# Patient Record
Sex: FEMALE | Race: WHITE | Employment: FULL TIME | URBAN - METROPOLITAN AREA
[De-identification: names, ages, dates, MRNs, and addresses within clinical notes are randomized per-mention and may not be internally consistent; named-entity substitution may affect disease eponyms.]

---

## 2017-10-19 ENCOUNTER — TELEPHONE (OUTPATIENT)
Dept: FAMILY MEDICINE CLINIC | Facility: CLINIC | Age: 32
End: 2017-10-19

## 2017-11-28 ENCOUNTER — OFFICE VISIT (OUTPATIENT)
Dept: FAMILY MEDICINE CLINIC | Facility: CLINIC | Age: 32
End: 2017-11-28

## 2017-11-28 VITALS
TEMPERATURE: 98 F | HEIGHT: 68 IN | BODY MASS INDEX: 31.52 KG/M2 | HEART RATE: 72 BPM | DIASTOLIC BLOOD PRESSURE: 72 MMHG | WEIGHT: 208 LBS | SYSTOLIC BLOOD PRESSURE: 102 MMHG

## 2017-11-28 DIAGNOSIS — Z00.00 LABORATORY EXAM ORDERED AS PART OF ROUTINE GENERAL MEDICAL EXAMINATION: ICD-10-CM

## 2017-11-28 DIAGNOSIS — Z00.00 ROUTINE GENERAL MEDICAL EXAMINATION AT A HEALTH CARE FACILITY: Primary | ICD-10-CM

## 2017-11-28 PROCEDURE — 99395 PREV VISIT EST AGE 18-39: CPT | Performed by: FAMILY MEDICINE

## 2017-11-28 NOTE — PROGRESS NOTES
HPI:   Erin Sánchez is a 34year old female who presents for a complete physical exam.  Last pap:   With GYN 2015; ; scheduled with GYN in 2018  Last mammogram:  n/a  Menses:  monthly  Contraception:  no  Previous colonoscopy:  no  Family hx of roman Children: 1 son.       REVIEW OF SYSTEMS:   GENERAL: feels well otherwise  SKIN: denies any unusual skin lesions  EYES:no vision problems  LUNGS: denies shortness of breath  CARDIOVASCULAR: denies chest pain  GI: denies abdominal pain,  No constipation or d

## 2017-12-18 ENCOUNTER — LABORATORY ENCOUNTER (OUTPATIENT)
Dept: LAB | Age: 32
End: 2017-12-18
Attending: FAMILY MEDICINE
Payer: COMMERCIAL

## 2017-12-18 DIAGNOSIS — Z00.00 LABORATORY EXAM ORDERED AS PART OF ROUTINE GENERAL MEDICAL EXAMINATION: ICD-10-CM

## 2017-12-18 PROCEDURE — 80053 COMPREHEN METABOLIC PANEL: CPT

## 2017-12-18 PROCEDURE — 80061 LIPID PANEL: CPT

## 2017-12-18 PROCEDURE — 85025 COMPLETE CBC W/AUTO DIFF WBC: CPT

## 2017-12-18 PROCEDURE — 84443 ASSAY THYROID STIM HORMONE: CPT

## 2017-12-18 PROCEDURE — 36415 COLL VENOUS BLD VENIPUNCTURE: CPT

## 2018-09-10 PROBLEM — O09.529 ADVANCED MATERNAL AGE IN MULTIGRAVIDA (HCC): Status: ACTIVE | Noted: 2018-09-10

## 2018-09-10 PROBLEM — O09.529 ADVANCED MATERNAL AGE IN MULTIGRAVIDA: Status: ACTIVE | Noted: 2018-09-10

## 2018-09-10 PROCEDURE — 87086 URINE CULTURE/COLONY COUNT: CPT | Performed by: OBSTETRICS & GYNECOLOGY

## 2018-09-10 PROCEDURE — 88175 CYTOPATH C/V AUTO FLUID REDO: CPT | Performed by: OBSTETRICS & GYNECOLOGY

## 2018-09-10 PROCEDURE — 87624 HPV HI-RISK TYP POOLED RSLT: CPT | Performed by: OBSTETRICS & GYNECOLOGY

## 2018-10-01 PROCEDURE — 86762 RUBELLA ANTIBODY: CPT | Performed by: OBSTETRICS & GYNECOLOGY

## 2018-10-01 PROCEDURE — 86901 BLOOD TYPING SEROLOGIC RH(D): CPT | Performed by: OBSTETRICS & GYNECOLOGY

## 2018-10-01 PROCEDURE — 86900 BLOOD TYPING SEROLOGIC ABO: CPT | Performed by: OBSTETRICS & GYNECOLOGY

## 2018-10-01 PROCEDURE — 87389 HIV-1 AG W/HIV-1&-2 AB AG IA: CPT | Performed by: OBSTETRICS & GYNECOLOGY

## 2018-10-01 PROCEDURE — 86850 RBC ANTIBODY SCREEN: CPT | Performed by: OBSTETRICS & GYNECOLOGY

## 2018-10-01 PROCEDURE — 86780 TREPONEMA PALLIDUM: CPT | Performed by: OBSTETRICS & GYNECOLOGY

## 2018-11-21 PROCEDURE — 82105 ALPHA-FETOPROTEIN SERUM: CPT | Performed by: OBSTETRICS & GYNECOLOGY

## 2018-11-21 PROCEDURE — 36415 COLL VENOUS BLD VENIPUNCTURE: CPT | Performed by: OBSTETRICS & GYNECOLOGY

## 2019-04-18 ENCOUNTER — TELEPHONE (OUTPATIENT)
Dept: OBGYN UNIT | Facility: HOSPITAL | Age: 34
End: 2019-04-18

## 2019-04-22 ENCOUNTER — APPOINTMENT (OUTPATIENT)
Dept: OBGYN CLINIC | Facility: HOSPITAL | Age: 34
End: 2019-04-22
Payer: COMMERCIAL

## 2019-04-22 ENCOUNTER — TELEPHONE (OUTPATIENT)
Dept: OBGYN UNIT | Facility: HOSPITAL | Age: 34
End: 2019-04-22

## 2019-04-22 ENCOUNTER — HOSPITAL ENCOUNTER (INPATIENT)
Facility: HOSPITAL | Age: 34
LOS: 3 days | Discharge: HOME | End: 2019-04-25
Attending: OBSTETRICS & GYNECOLOGY | Admitting: OBSTETRICS & GYNECOLOGY
Payer: COMMERCIAL

## 2019-04-22 PROBLEM — Z34.90 PREGNANCY: Status: ACTIVE | Noted: 2019-04-22

## 2019-04-22 PROBLEM — Z34.90 PREGNANCY (HCC): Status: ACTIVE | Noted: 2019-04-22

## 2019-04-22 PROCEDURE — 86900 BLOOD TYPING SEROLOGIC ABO: CPT | Performed by: OBSTETRICS & GYNECOLOGY

## 2019-04-22 PROCEDURE — 85027 COMPLETE CBC AUTOMATED: CPT | Performed by: OBSTETRICS & GYNECOLOGY

## 2019-04-22 PROCEDURE — 86780 TREPONEMA PALLIDUM: CPT | Performed by: OBSTETRICS & GYNECOLOGY

## 2019-04-22 PROCEDURE — 86901 BLOOD TYPING SEROLOGIC RH(D): CPT | Performed by: OBSTETRICS & GYNECOLOGY

## 2019-04-22 PROCEDURE — 86850 RBC ANTIBODY SCREEN: CPT | Performed by: OBSTETRICS & GYNECOLOGY

## 2019-04-22 PROCEDURE — 86701 HIV-1ANTIBODY: CPT | Performed by: OBSTETRICS & GYNECOLOGY

## 2019-04-22 PROCEDURE — 3E0P7VZ INTRODUCTION OF HORMONE INTO FEMALE REPRODUCTIVE, VIA NATURAL OR ARTIFICIAL OPENING: ICD-10-PCS | Performed by: OBSTETRICS & GYNECOLOGY

## 2019-04-22 RX ORDER — TERBUTALINE SULFATE 1 MG/ML
0.25 INJECTION, SOLUTION SUBCUTANEOUS AS NEEDED
Status: DISCONTINUED | OUTPATIENT
Start: 2019-04-22 | End: 2019-04-23 | Stop reason: HOSPADM

## 2019-04-22 RX ORDER — IBUPROFEN 600 MG/1
600 TABLET ORAL ONCE AS NEEDED
Status: DISCONTINUED | OUTPATIENT
Start: 2019-04-22 | End: 2019-04-23 | Stop reason: HOSPADM

## 2019-04-22 RX ORDER — SODIUM CHLORIDE, SODIUM LACTATE, POTASSIUM CHLORIDE, CALCIUM CHLORIDE 600; 310; 30; 20 MG/100ML; MG/100ML; MG/100ML; MG/100ML
INJECTION, SOLUTION INTRAVENOUS CONTINUOUS
Status: DISCONTINUED | OUTPATIENT
Start: 2019-04-22 | End: 2019-04-23 | Stop reason: HOSPADM

## 2019-04-22 RX ORDER — TRISODIUM CITRATE DIHYDRATE AND CITRIC ACID MONOHYDRATE 500; 334 MG/5ML; MG/5ML
30 SOLUTION ORAL AS NEEDED
Status: DISCONTINUED | OUTPATIENT
Start: 2019-04-22 | End: 2019-04-23 | Stop reason: HOSPADM

## 2019-04-22 RX ORDER — DEXTROSE, SODIUM CHLORIDE, SODIUM LACTATE, POTASSIUM CHLORIDE, AND CALCIUM CHLORIDE 5; .6; .31; .03; .02 G/100ML; G/100ML; G/100ML; G/100ML; G/100ML
INJECTION, SOLUTION INTRAVENOUS AS NEEDED
Status: DISCONTINUED | OUTPATIENT
Start: 2019-04-22 | End: 2019-04-23 | Stop reason: HOSPADM

## 2019-04-22 RX ORDER — ZOLPIDEM TARTRATE 5 MG/1
5 TABLET ORAL NIGHTLY PRN
Status: DISCONTINUED | OUTPATIENT
Start: 2019-04-22 | End: 2019-04-23 | Stop reason: HOSPADM

## 2019-04-23 PROCEDURE — 0KQM0ZZ REPAIR PERINEUM MUSCLE, OPEN APPROACH: ICD-10-PCS | Performed by: OBSTETRICS & GYNECOLOGY

## 2019-04-23 PROCEDURE — 3E033VJ INTRODUCTION OF OTHER HORMONE INTO PERIPHERAL VEIN, PERCUTANEOUS APPROACH: ICD-10-PCS | Performed by: OBSTETRICS & GYNECOLOGY

## 2019-04-23 RX ORDER — IBUPROFEN 600 MG/1
600 TABLET ORAL EVERY 6 HOURS
Status: DISCONTINUED | OUTPATIENT
Start: 2019-04-24 | End: 2019-04-25

## 2019-04-23 RX ORDER — BISACODYL 10 MG
10 SUPPOSITORY, RECTAL RECTAL ONCE AS NEEDED
Status: DISCONTINUED | OUTPATIENT
Start: 2019-04-23 | End: 2019-04-25

## 2019-04-23 RX ORDER — ZOLPIDEM TARTRATE 5 MG/1
5 TABLET ORAL NIGHTLY PRN
Status: DISCONTINUED | OUTPATIENT
Start: 2019-04-23 | End: 2019-04-25

## 2019-04-23 RX ORDER — NALBUPHINE HCL 10 MG/ML
2.5 AMPUL (ML) INJECTION
Status: DISCONTINUED | OUTPATIENT
Start: 2019-04-23 | End: 2019-04-23

## 2019-04-23 RX ORDER — ACETAMINOPHEN 325 MG/1
650 TABLET ORAL EVERY 6 HOURS PRN
Status: DISCONTINUED | OUTPATIENT
Start: 2019-04-23 | End: 2019-04-25

## 2019-04-23 RX ORDER — HYDROMORPHONE HYDROCHLORIDE 1 MG/ML
1 INJECTION, SOLUTION INTRAMUSCULAR; INTRAVENOUS; SUBCUTANEOUS EVERY 2 HOUR PRN
Status: DISCONTINUED | OUTPATIENT
Start: 2019-04-23 | End: 2019-04-23

## 2019-04-23 RX ORDER — HYDROMORPHONE HYDROCHLORIDE 1 MG/ML
INJECTION, SOLUTION INTRAMUSCULAR; INTRAVENOUS; SUBCUTANEOUS
Status: DISPENSED
Start: 2019-04-23 | End: 2019-04-24

## 2019-04-23 RX ORDER — SIMETHICONE 80 MG
80 TABLET,CHEWABLE ORAL 3 TIMES DAILY PRN
Status: DISCONTINUED | OUTPATIENT
Start: 2019-04-23 | End: 2019-04-25

## 2019-04-23 RX ORDER — EPHEDRINE SULFATE/0.9% NACL/PF 25 MG/5 ML
5 SYRINGE (ML) INTRAVENOUS AS NEEDED
Status: DISCONTINUED | OUTPATIENT
Start: 2019-04-23 | End: 2019-04-23

## 2019-04-23 RX ORDER — DOCUSATE SODIUM 100 MG/1
100 CAPSULE, LIQUID FILLED ORAL
Status: DISCONTINUED | OUTPATIENT
Start: 2019-04-23 | End: 2019-04-25

## 2019-04-23 NOTE — L&D DELIVERY NOTE
OB/GYN: Vaginal Delivery Note       History:   OB History     T1    L1    SAB1  TAB0  Ectopic1  Multiple0  Live Births1        Procedure:     Obstetrician:  Weeks  Anesthesia: epidural  Episiotomy or Laceration: No episiotomy, 2nd degree pe

## 2019-04-23 NOTE — PROGRESS NOTES
SUBJECTIVE:   pt without complaints. Not feeling strong contractions    OBJECTIVE:  VS:  height is 5' 11\" (1.803 m) and weight is 248 lb (112.5 kg). Her oral temperature is 98 °F (36.7 °C). Her blood pressure is 119/66 and her pulse is 80.  Her respiratio

## 2019-04-23 NOTE — H&P
[address] and Delivery Prenatal History and Physical Interval Addendum  Please see full Prenatal Record for this pregnancy      SUBJECTIVE:    Interval History:      This is a pregnancy at 36 1/7 weeks admitted for post due date IOL    OBJECTIV

## 2019-04-24 PROCEDURE — 85025 COMPLETE CBC W/AUTO DIFF WBC: CPT | Performed by: OBSTETRICS & GYNECOLOGY

## 2019-04-24 NOTE — PROGRESS NOTES
Patient up to bathroom with assist x 2. Voided 600cc. Patient transferred to mother/baby room 1107 per wheelchair in stable condition with baby and personal belongings. Accompanied by significant other and staff. Report given to mother/baby RN.

## 2019-04-24 NOTE — PROGRESS NOTES
Postpartum Day 1    Pt without complaints.     Temp: 98.3 °F (36.8 °C)  Pulse: 78  Resp: 16  BP: 120/60  abd  soft, NT, ND, fundus firm below umbilicus  perineum NL lochia  extr  trace edema, no calf tenderness  Recent Labs   Lab 04/24/19  0642   RBC 3.46*

## 2019-04-24 NOTE — PLAN OF CARE
NURSING ADMISSION NOTE  Patient admitted via Wheelchair  Oriented to room. Safety precautions initiated. Bed in low position. Call light in reach.      Problem: POSTPARTUM  Goal: Long Term Goal:Experiences normal postpartum course  Description  INTERVENT challenges. - Recommend avoidance of specific medications or substances incompatible with breast feeding.  - Assess and monitor for signs of nipple pain/trauma. - Instruct and provide assistance with proper latch.   - Review techniques for milk expression

## 2019-04-25 VITALS
HEART RATE: 73 BPM | DIASTOLIC BLOOD PRESSURE: 63 MMHG | RESPIRATION RATE: 17 BRPM | TEMPERATURE: 99 F | OXYGEN SATURATION: 94 % | SYSTOLIC BLOOD PRESSURE: 127 MMHG | BODY MASS INDEX: 34.72 KG/M2 | WEIGHT: 248 LBS | HEIGHT: 71 IN

## 2019-04-25 NOTE — PROGRESS NOTES
Felice Skelton Patient Status:  Inpatient    1985 MRN [id number]   [facility] 1SW-J Attending Rasta Greene MD   Hosp Day # 3 PCP Svitlana Dunaway MD     Pt has no complaints  Breast Feeding:  y    Afebrile  VS stable  Abdomen:  Soft N

## 2019-04-25 NOTE — PLAN OF CARE
Problem: POSTPARTUM  Goal: Long Term Goal:Experiences normal postpartum course  Description  INTERVENTIONS:  - Assess and monitor vital signs and lab values. - Assess fundus and lochia. - Provide ice/sitz baths for perineum discomfort.   - Monitor heali and monitor for signs of nipple pain/trauma. - Instruct and provide assistance with proper latch. - Review techniques for milk expression (breast pumping) and storage of breast milk.  Provide pumping equipment/supplies, instructions and assistance, as jeannie

## 2019-04-27 ENCOUNTER — TELEPHONE (OUTPATIENT)
Dept: OBGYN UNIT | Facility: HOSPITAL | Age: 34
End: 2019-04-27

## 2019-10-24 ENCOUNTER — OFFICE VISIT (OUTPATIENT)
Dept: FAMILY MEDICINE CLINIC | Facility: CLINIC | Age: 34
End: 2019-10-24

## 2019-10-24 VITALS
DIASTOLIC BLOOD PRESSURE: 58 MMHG | OXYGEN SATURATION: 98 % | WEIGHT: 209 LBS | RESPIRATION RATE: 16 BRPM | SYSTOLIC BLOOD PRESSURE: 107 MMHG | HEIGHT: 71 IN | TEMPERATURE: 98 F | HEART RATE: 77 BPM | BODY MASS INDEX: 29.26 KG/M2

## 2019-10-24 DIAGNOSIS — R05.3 PERSISTENT COUGH FOR 3 WEEKS OR LONGER: Primary | ICD-10-CM

## 2019-10-24 PROCEDURE — 99213 OFFICE O/P EST LOW 20 MIN: CPT | Performed by: NURSE PRACTITIONER

## 2019-10-24 RX ORDER — AZITHROMYCIN 250 MG/1
TABLET, FILM COATED ORAL
Qty: 6 TABLET | Refills: 0 | Status: SHIPPED | OUTPATIENT
Start: 2019-10-24 | End: 2021-11-29 | Stop reason: ALTCHOICE

## 2019-10-24 RX ORDER — BENZONATATE 200 MG/1
200 CAPSULE ORAL 3 TIMES DAILY PRN
Qty: 20 CAPSULE | Refills: 0 | Status: SHIPPED | OUTPATIENT
Start: 2019-10-24 | End: 2019-10-31

## 2019-10-24 NOTE — PROGRESS NOTES
CHIEF COMPLAINT:   Patient presents with:  Cough: cough, x 4wks       HPI:   Tomas Quick is a 27year old female who presents for upper respiratory symptoms for  4 weeks.  Patient reports dry cough, cough is keeping pt up at night, chest pain from cough 98%   BMI 29.15 kg/m²   GENERAL: well developed, well nourished, and in no apparent distress, afebrile  SKIN: no rashes, no suspicious lesions  HEAD: atraumatic, normocephalic.  no tenderness on palpation of maxillary or frontal sinuses.   EYES: conjunctiva

## 2019-10-30 ENCOUNTER — OFFICE VISIT (OUTPATIENT)
Dept: FAMILY MEDICINE CLINIC | Facility: CLINIC | Age: 34
End: 2019-10-30

## 2019-10-30 VITALS
TEMPERATURE: 99 F | BODY MASS INDEX: 28.98 KG/M2 | RESPIRATION RATE: 16 BRPM | OXYGEN SATURATION: 99 % | SYSTOLIC BLOOD PRESSURE: 102 MMHG | HEIGHT: 71 IN | WEIGHT: 207 LBS | HEART RATE: 106 BPM | DIASTOLIC BLOOD PRESSURE: 70 MMHG

## 2019-10-30 DIAGNOSIS — J40 BRONCHITIS: Primary | ICD-10-CM

## 2019-10-30 PROCEDURE — 99213 OFFICE O/P EST LOW 20 MIN: CPT | Performed by: NURSE PRACTITIONER

## 2019-10-30 RX ORDER — PREDNISONE 20 MG/1
40 TABLET ORAL DAILY
Qty: 10 TABLET | Refills: 0 | Status: SHIPPED | OUTPATIENT
Start: 2019-10-30 | End: 2019-11-04

## 2019-10-30 NOTE — PROGRESS NOTES
CHIEF COMPLAINT:   Patient presents with:  Cough: s/s for 5 week. RX meds taken        HPI:   Mitra Awad is a 27year old female who presents for cough for  6  weeks. Cough started gradually and is described as tight and deep.  Patient has history of bro GENERAL: well developed, well nourished,in no apparent distress  SKIN: no rashes, no suspicious lesions  EYES: Conjunctiva clear. No scleral icterus. HENT: Atraumatic, normocephalic. TM's clear bilaterally.   Nostrils patent, nasal mucosa pink and non-in Bronchitis often occurs with a cold or the flu virus. The airways become inflamed (red and swollen). There is a deep hacking cough from the extra mucus.  Other symptoms may include:  · Wheezing  · Chest discomfort  · Shortness of breath  · Mild fever  A sec · Sit up or use extra pillows when in bed. This helps to lessen coughing and congestion. · Ask your provider about using medicine. Ask about using cough medicine, pain and fever medicine, or a decongestant.   Antibiotics  Most cases of bronchitis are cause · Breathing problems worsen  · Symptoms don’t get better within a week, or within 3 days of taking antibioticsCall 911 if you are:  · wheezing  · feeling lightheaded or dizzy  · unable to talk  · skin or nails looks blue or pale    Inside healthy lungs

## 2020-12-23 ENCOUNTER — TELEPHONE (OUTPATIENT)
Dept: FAMILY MEDICINE CLINIC | Facility: CLINIC | Age: 35
End: 2020-12-23

## 2020-12-23 DIAGNOSIS — Z00.00 ROUTINE GENERAL MEDICAL EXAMINATION AT A HEALTH CARE FACILITY: Primary | ICD-10-CM

## 2020-12-23 NOTE — TELEPHONE ENCOUNTER
Please enter lab orders for the patient's upcoming physical appointment. Physical scheduled:    Your appointments     Date & Time Appointment Department [facility])    Feb 05, 2021  8:45 AM CST Physical - Established with Stanislav Valerio MD [facility]

## 2021-10-22 ENCOUNTER — LAB ENCOUNTER (OUTPATIENT)
Dept: LAB | Age: 36
End: 2021-10-22
Attending: FAMILY MEDICINE
Payer: COMMERCIAL

## 2021-10-22 DIAGNOSIS — Z00.00 ROUTINE GENERAL MEDICAL EXAMINATION AT A HEALTH CARE FACILITY: ICD-10-CM

## 2021-10-22 PROCEDURE — 85027 COMPLETE CBC AUTOMATED: CPT

## 2021-10-22 PROCEDURE — 80053 COMPREHEN METABOLIC PANEL: CPT

## 2021-10-22 PROCEDURE — 80061 LIPID PANEL: CPT

## 2021-10-22 PROCEDURE — 84443 ASSAY THYROID STIM HORMONE: CPT

## 2021-10-22 PROCEDURE — 36415 COLL VENOUS BLD VENIPUNCTURE: CPT

## 2021-10-26 ENCOUNTER — TELEPHONE (OUTPATIENT)
Dept: FAMILY MEDICINE CLINIC | Facility: CLINIC | Age: 36
End: 2021-10-26

## 2021-10-26 NOTE — TELEPHONE ENCOUNTER
Pt no showed her appt for today. She stated that her child was sick.  Per Dr Huffman Dec charge a $40.00

## 2021-11-29 ENCOUNTER — OFFICE VISIT (OUTPATIENT)
Dept: FAMILY MEDICINE CLINIC | Facility: CLINIC | Age: 36
End: 2021-11-29
Payer: COMMERCIAL

## 2021-11-29 VITALS
HEIGHT: 68 IN | BODY MASS INDEX: 31.73 KG/M2 | WEIGHT: 209.38 LBS | DIASTOLIC BLOOD PRESSURE: 72 MMHG | HEART RATE: 72 BPM | SYSTOLIC BLOOD PRESSURE: 118 MMHG | TEMPERATURE: 98 F

## 2021-11-29 DIAGNOSIS — Z23 NEED FOR PROPHYLACTIC VACCINATION AND INOCULATION AGAINST INFLUENZA: ICD-10-CM

## 2021-11-29 DIAGNOSIS — Z00.00 ROUTINE GENERAL MEDICAL EXAMINATION AT A HEALTH CARE FACILITY: Primary | ICD-10-CM

## 2021-11-29 PROCEDURE — 3077F SYST BP >= 140 MM HG: CPT | Performed by: FAMILY MEDICINE

## 2021-11-29 PROCEDURE — 90471 IMMUNIZATION ADMIN: CPT | Performed by: FAMILY MEDICINE

## 2021-11-29 PROCEDURE — 3008F BODY MASS INDEX DOCD: CPT | Performed by: FAMILY MEDICINE

## 2021-11-29 PROCEDURE — 90686 IIV4 VACC NO PRSV 0.5 ML IM: CPT | Performed by: FAMILY MEDICINE

## 2021-11-29 PROCEDURE — 99395 PREV VISIT EST AGE 18-39: CPT | Performed by: FAMILY MEDICINE

## 2021-11-29 PROCEDURE — 3078F DIAST BP <80 MM HG: CPT | Performed by: FAMILY MEDICINE

## 2021-11-29 NOTE — PROGRESS NOTES
HPI:   Gera Asencio is a 39year old female who presents for a complete physical exam.  Last pap: 9/2018 - sees GYN  Last mammogram:  n/a  Menses: light due to IUD  Contraception:  IUD - Mirena  Previous colonoscopy:  no  Family hx of breast, ovarian, cervi Use      Smoking status: Never Smoker      Smokeless tobacco: Never Used    Vaping Use      Vaping Use: Never used    Alcohol use:  Yes      Alcohol/week: 0.0 - 1.0 standard drinks    Drug use: No       REVIEW OF SYSTEMS:   GENERAL: feels well otherwise  SK

## 2022-09-26 ENCOUNTER — OFFICE VISIT (OUTPATIENT)
Dept: FAMILY MEDICINE CLINIC | Facility: CLINIC | Age: 37
End: 2022-09-26

## 2022-09-26 VITALS
WEIGHT: 195.63 LBS | SYSTOLIC BLOOD PRESSURE: 118 MMHG | DIASTOLIC BLOOD PRESSURE: 80 MMHG | HEIGHT: 68 IN | BODY MASS INDEX: 29.65 KG/M2 | HEART RATE: 64 BPM | RESPIRATION RATE: 16 BRPM

## 2022-09-26 DIAGNOSIS — M72.2 PLANTAR FASCIITIS OF LEFT FOOT: Primary | ICD-10-CM

## 2022-09-26 RX ORDER — NAPROXEN 500 MG/1
500 TABLET ORAL 2 TIMES DAILY WITH MEALS
Qty: 10 TABLET | Refills: 0 | Status: SHIPPED | OUTPATIENT
Start: 2022-09-26

## 2022-10-13 ENCOUNTER — TELEPHONE (OUTPATIENT)
Dept: PHYSICAL THERAPY | Facility: HOSPITAL | Age: 37
End: 2022-10-13

## 2022-10-14 ENCOUNTER — TELEPHONE (OUTPATIENT)
Dept: PHYSICAL THERAPY | Age: 37
End: 2022-10-14

## 2022-10-14 ENCOUNTER — APPOINTMENT (OUTPATIENT)
Dept: PHYSICAL THERAPY | Age: 37
End: 2022-10-14
Attending: NURSE PRACTITIONER
Payer: COMMERCIAL

## 2022-10-18 ENCOUNTER — TELEPHONE (OUTPATIENT)
Dept: PHYSICAL THERAPY | Age: 37
End: 2022-10-18

## 2022-10-18 ENCOUNTER — APPOINTMENT (OUTPATIENT)
Dept: PHYSICAL THERAPY | Age: 37
End: 2022-10-18
Attending: NURSE PRACTITIONER
Payer: COMMERCIAL

## 2022-10-18 NOTE — TELEPHONE ENCOUNTER
Patient did not show for her appointment - was reminded of this appointment when she no showed for her last appointment. Per attendance policy all appointments will be removed and she will have to call the office to reschedule.

## 2022-10-21 ENCOUNTER — APPOINTMENT (OUTPATIENT)
Dept: PHYSICAL THERAPY | Age: 37
End: 2022-10-21
Attending: NURSE PRACTITIONER
Payer: COMMERCIAL

## 2022-10-25 ENCOUNTER — APPOINTMENT (OUTPATIENT)
Dept: PHYSICAL THERAPY | Age: 37
End: 2022-10-25
Attending: NURSE PRACTITIONER
Payer: COMMERCIAL

## 2022-11-01 ENCOUNTER — APPOINTMENT (OUTPATIENT)
Dept: PHYSICAL THERAPY | Age: 37
End: 2022-11-01
Attending: NURSE PRACTITIONER
Payer: COMMERCIAL

## 2022-11-04 ENCOUNTER — APPOINTMENT (OUTPATIENT)
Dept: PHYSICAL THERAPY | Age: 37
End: 2022-11-04
Attending: NURSE PRACTITIONER
Payer: COMMERCIAL

## 2023-05-18 ENCOUNTER — TELEPHONE (OUTPATIENT)
Dept: PHYSICAL THERAPY | Facility: HOSPITAL | Age: 38
End: 2023-05-18

## 2023-05-23 ENCOUNTER — OFFICE VISIT (OUTPATIENT)
Facility: LOCATION | Age: 38
End: 2023-05-23
Attending: NURSE PRACTITIONER
Payer: COMMERCIAL

## 2023-05-23 DIAGNOSIS — M72.2 PLANTAR FASCIITIS OF LEFT FOOT: ICD-10-CM

## 2023-05-23 PROCEDURE — 97110 THERAPEUTIC EXERCISES: CPT

## 2023-05-23 PROCEDURE — 97161 PT EVAL LOW COMPLEX 20 MIN: CPT

## 2023-05-25 ENCOUNTER — OFFICE VISIT (OUTPATIENT)
Facility: LOCATION | Age: 38
End: 2023-05-25
Attending: NURSE PRACTITIONER
Payer: COMMERCIAL

## 2023-05-25 PROCEDURE — 97110 THERAPEUTIC EXERCISES: CPT

## 2023-05-25 PROCEDURE — 97140 MANUAL THERAPY 1/> REGIONS: CPT

## 2023-06-06 ENCOUNTER — OFFICE VISIT (OUTPATIENT)
Facility: LOCATION | Age: 38
End: 2023-06-06
Attending: NURSE PRACTITIONER
Payer: COMMERCIAL

## 2023-06-06 PROCEDURE — 97140 MANUAL THERAPY 1/> REGIONS: CPT

## 2023-06-06 PROCEDURE — 97110 THERAPEUTIC EXERCISES: CPT

## 2023-06-08 ENCOUNTER — TELEPHONE (OUTPATIENT)
Dept: PHYSICAL THERAPY | Facility: HOSPITAL | Age: 38
End: 2023-06-08

## 2023-06-08 ENCOUNTER — APPOINTMENT (OUTPATIENT)
Facility: LOCATION | Age: 38
End: 2023-06-08
Attending: NURSE PRACTITIONER
Payer: COMMERCIAL

## 2023-06-09 ENCOUNTER — OFFICE VISIT (OUTPATIENT)
Facility: LOCATION | Age: 38
End: 2023-06-09
Attending: NURSE PRACTITIONER
Payer: COMMERCIAL

## 2023-06-09 PROCEDURE — 97110 THERAPEUTIC EXERCISES: CPT

## 2023-06-09 PROCEDURE — 97140 MANUAL THERAPY 1/> REGIONS: CPT

## 2023-06-19 ENCOUNTER — TELEPHONE (OUTPATIENT)
Dept: PHYSICAL THERAPY | Facility: HOSPITAL | Age: 38
End: 2023-06-19

## 2023-06-20 ENCOUNTER — APPOINTMENT (OUTPATIENT)
Facility: LOCATION | Age: 38
End: 2023-06-20
Attending: NURSE PRACTITIONER
Payer: COMMERCIAL

## 2023-06-22 ENCOUNTER — TELEPHONE (OUTPATIENT)
Dept: PHYSICAL THERAPY | Facility: HOSPITAL | Age: 38
End: 2023-06-22

## 2023-06-22 ENCOUNTER — APPOINTMENT (OUTPATIENT)
Facility: LOCATION | Age: 38
End: 2023-06-22
Attending: NURSE PRACTITIONER
Payer: COMMERCIAL

## 2023-06-26 ENCOUNTER — OFFICE VISIT (OUTPATIENT)
Facility: LOCATION | Age: 38
End: 2023-06-26
Attending: NURSE PRACTITIONER
Payer: COMMERCIAL

## 2023-06-26 PROCEDURE — 97140 MANUAL THERAPY 1/> REGIONS: CPT

## 2023-06-26 PROCEDURE — 97110 THERAPEUTIC EXERCISES: CPT

## 2023-06-26 NOTE — PROGRESS NOTES
Diagnosis:   Plantar fasciitis of left foot (M72.2)   Referring Provider: Tyra Izquierdo  Date of Evaluation:    5/23/23    Precautions:  none Next MD visit:   none scheduled  Date of Surgery: n/a   Insurance Primary/Secondary: Michelle Hernandez / N/A     # Auth Visits: 9            Subjective: not having any problems since last visit, no pain in the morning     Pain: 0/10      Objective:  See flowsheet      Assessment:   Medial (L) calf tightness (gastroc) and soleus - mild tenderness but improving  Myofascial restrictions improved with IASTM   Tolerating exercises well with no increase in pain. Functionally doing well         Goals:   Pt will demonstrate improved DF AROM to >= 10 degrees to promote proper foot clearance during gait and greater ease descending stairs without compensation  Pt will have increased ankle strength to 5/5 throughout for improved ankle control prolonged gait, steps, squatting  Pt will achieve at least 4/5 (B) hip strength in order to optimize ideal alignment throughout the kinetic chain and maximize effectiveness and efficiency with functional activities  Pt will report being able to take first steps in the morning with minimal to no pain  Pt will be able to walk 2 miles or > with less reported pain in the afternoon and evening   Pt will be independent and compliant with comprehensive HEP to maintain progress achieved in PT       Plan: improve calf flexibility, LE strength, ankle mobility   Date: 5/25/2023  TX#: 2/8 Date:  6/6/23             TX#: 3/8 Date: 6/9/23                TX#: 4/8 Date:  6/26/23               TX#: 5/8 Date:    Tx#: 6/   Manual Tx Manual Tx Manual Tx Manual Tx    STM/MFR (L) calf musculature   (L) TC posterior glide, grade III STM/MFR (L) calf musculature   (L) TC posterior glide, grade III STM/MFR (L) calf musculature   (L) TC posterior glide, grade III STM/MFR (L) calf musculature   IASTM (L) calf  (L) TC posterior glide, grade III    TherEx TherEx TherEx TherEx    Seated PF mobilization w/ball x 3 min Seated PF mobilization w/ball x 3 min Seated PF mobilization w/ball x 3 min Seated PF mobilization w/ball x 3 min    Seated PF stretch x 10 (10 seconds) Seated PF stretch x 10 (10 seconds) Standing calf stretch (prostretch) 1 Standing calf stretch (prostretch) 1    Standing calf stretch (prostretch) 1 min x 2 Standing calf stretch (prostretch) 1 min x 2 Standing soleus stretch 1 min x 2 Standing soleus stretch 1 min x 2    Standing soleus stretch 1 min x 2 Standing soleus stretch 1 min x 2 Standing heel raise x 20 Standing heel raise x 20    Standing heel raise x 20 Standing heel raise x 20 Single heel raise x 10 each Single heel raise x 10 each     Balance board AP & SS x 40 DF mobilization on step with PT x 15 DF mobilization on step with PT x 15     Side step w/GTB x 2 laps   Seated PF stretch x 10 (10 seconds)       Bridge x 15       SL clam GTB x 20       SS GTB x 2 laps     HEP: Standing gastroc & soleus stretch against wall, seated PF stretch, seated PF self mobilization, seated towel scrunches, supine HS stretch      Charges: Manual - 2 units;  TherEx - 1 unit     Total Timed Treatment: 40 min  Total Treatment Time: 40 min

## 2023-06-27 ENCOUNTER — TELEPHONE (OUTPATIENT)
Dept: PHYSICAL THERAPY | Facility: HOSPITAL | Age: 38
End: 2023-06-27

## 2023-07-07 ENCOUNTER — OFFICE VISIT (OUTPATIENT)
Facility: LOCATION | Age: 38
End: 2023-07-07
Attending: NURSE PRACTITIONER
Payer: COMMERCIAL

## 2023-07-07 PROCEDURE — 97110 THERAPEUTIC EXERCISES: CPT

## 2023-07-07 PROCEDURE — 97140 MANUAL THERAPY 1/> REGIONS: CPT

## 2023-07-07 NOTE — PROGRESS NOTES
Diagnosis:   Plantar fasciitis of left foot (M72.2)   Referring Provider: Charmaine Acevedo  Date of Evaluation:    5/23/23    Precautions:  none Next MD visit:   none scheduled  Date of Surgery: n/a   Insurance Primary/Secondary: Brittney Lauren / N/A     # Auth Visits: 9            Subjective: foot and ankle doing well; not having much pain at all     Pain: 0/10      Objective:  See flowsheet      Assessment:   Medial (L) calf tightness (gastroc) and soleus - mild tenderness but improving (less restrictions than previous visits)  Less restrictions in PF as well. A bit more restricted (L) HS - encouraged soft tissue work and stretching at home along with other exercises.    Functionally is doing much better with walking, stairs, first steps in the morning         Goals:   Pt will demonstrate improved DF AROM to >= 10 degrees to promote proper foot clearance during gait and greater ease descending stairs without compensation  Pt will have increased ankle strength to 5/5 throughout for improved ankle control prolonged gait, steps, squatting  Pt will achieve at least 4/5 (B) hip strength in order to optimize ideal alignment throughout the kinetic chain and maximize effectiveness and efficiency with functional activities  Pt will report being able to take first steps in the morning with minimal to no pain  Pt will be able to walk 2 miles or > with less reported pain in the afternoon and evening   Pt will be independent and compliant with comprehensive HEP to maintain progress achieved in PT       Plan: improve calf flexibility, LE strength, ankle mobility   Date: 5/25/2023  TX#: 2/8 Date:  6/6/23             TX#: 3/8 Date: 6/9/23                TX#: 4/8 Date:  6/26/23               TX#: 5/8 Date: 7/7/23  Tx#: 6/8   Manual Tx Manual Tx Manual Tx Manual Tx Manual Tx   STM/MFR (L) calf musculature   (L) TC posterior glide, grade III STM/MFR (L) calf musculature   (L) TC posterior glide, grade III STM/MFR (L) calf musculature   (L) TC posterior glide, grade III STM/MFR (L) calf musculature   IASTM (L) calf  (L) TC posterior glide, grade III STM/MFR (L) calf musculature   (L) HS STM    TherEx TherEx TherEx TherEx TherEx   Seated PF mobilization w/ball x 3 min Seated PF mobilization w/ball x 3 min Seated PF mobilization w/ball x 3 min Seated PF mobilization w/ball x 3 min Seated PF mobilization w/ball x 3 min   Seated PF stretch x 10 (10 seconds) Seated PF stretch x 10 (10 seconds) Standing calf stretch (prostretch) 1 Standing calf stretch (prostretch) 1 Standing calf stretch (prostretch) 1   Standing calf stretch (prostretch) 1 min x 2 Standing calf stretch (prostretch) 1 min x 2 Standing soleus stretch 1 min x 2 Standing soleus stretch 1 min x 2 Standing soleus stretch 1 min x 2   Standing soleus stretch 1 min x 2 Standing soleus stretch 1 min x 2 Standing heel raise x 20 Standing heel raise x 20 Standing heel raise x 20   Standing heel raise x 20 Standing heel raise x 20 Single heel raise x 10 each Single heel raise x 10 each Single heel raise x 10 each    Balance board AP & SS x 40 DF mobilization on step with PT x 15 DF mobilization on step with PT x 15 Standing HS stretch 30 sec x 3    Side step w/GTB x 2 laps   Seated PF stretch x 10 (10 seconds) Seated PF stretch x 10 (10 seconds)      Bridge x 15       SL clam GTB x 20       SS GTB x 2 laps     HEP: Standing gastroc & soleus stretch against wall, seated PF stretch, seated PF self mobilization, seated towel scrunches, supine HS stretch      Charges: Manual - 1 units;  TherEx - 1 unit     Total Timed Treatment: 30 min  Total Treatment Time: 30 min

## 2024-01-04 ENCOUNTER — OFFICE VISIT (OUTPATIENT)
Dept: FAMILY MEDICINE CLINIC | Facility: CLINIC | Age: 39
End: 2024-01-04
Payer: COMMERCIAL

## 2024-01-04 VITALS
HEART RATE: 80 BPM | TEMPERATURE: 98 F | DIASTOLIC BLOOD PRESSURE: 70 MMHG | BODY MASS INDEX: 29 KG/M2 | OXYGEN SATURATION: 99 % | RESPIRATION RATE: 16 BRPM | WEIGHT: 190 LBS | SYSTOLIC BLOOD PRESSURE: 118 MMHG

## 2024-01-04 DIAGNOSIS — M75.81 TENDINITIS OF RIGHT ROTATOR CUFF: Primary | ICD-10-CM

## 2024-01-04 PROCEDURE — 3074F SYST BP LT 130 MM HG: CPT | Performed by: PHYSICIAN ASSISTANT

## 2024-01-04 PROCEDURE — 99213 OFFICE O/P EST LOW 20 MIN: CPT | Performed by: PHYSICIAN ASSISTANT

## 2024-01-04 PROCEDURE — 3078F DIAST BP <80 MM HG: CPT | Performed by: PHYSICIAN ASSISTANT

## 2024-01-04 NOTE — PROGRESS NOTES
Chief Complaint   Patient presents with    Shoulder Pain     [id number], right shoulder        HISTORY OF PRESENT ILLNESS  Rhiannon Christianson is a 38 year old female who presents for right shoulder pain. Started insidiously about three months ago. Described as dull/ aching pain. Not interfering with activities but can be uncomfortable to sleep on this shoulder No hx of injuries, injections, surgeries. Has tried to continue with stretches. No new neck pain, numbness, tingling, weakness.      No current outpatient medications on file.    Allergies: Patient has no known allergies.    Patient Active Problem List   Diagnosis    Advanced maternal age in multigravida    Pregnancy       Past Surgical History:   Procedure Laterality Date    Breast lumpectomy Left 5/21/2016    Procedure: BREAST LUMPECTOMY;  Surgeon: Bj Boyce MD;  Location: [facility]  5/2009       Social History     Socioeconomic History    Marital status:    Occupational History    Occupation: Workable    Tobacco Use    Smoking status: Never    Smokeless tobacco: Never   Vaping Use    Vaping Use: Never used   Substance and Sexual Activity    Alcohol use: Yes     Alcohol/week: 0.0 - 1.0 standard drinks of alcohol    Drug use: No    Sexual activity: Yes     Partners: Male   Other Topics Concern    Caffeine Concern Yes     Comment: 2 cups of coffee daily     Exercise Yes     Comment: walking-daily    Seat Belt Yes    Self-Exams Yes         Vitals:    01/04/24 1425   BP: 118/70   Pulse: 80   Resp: 16   Temp: 97.5 °F (36.4 °C)       PHYSICAL EXAM  GENERAL: Alert, relaxed female, under no acute distress.  SKIN: Warm, moist, no lesions or ecchymosis.   MUSCULOSKELETAL:   Shoulder exam, right.   Inspection -  No swelling, erythema or brusing..  Palpation - non-tender.   ROM, Abduction - abnormal: painful after 90 degrees.  Forward flexion - abnormal: painful after 90 degrees.  Internal rotation - normal. External rotation - normal.   Sana  and Eastman impingement signs - Positive.   Cross-body, Obrians testing - Negative    Angela's test for supraspinatus - Positive  External rotation against resistance, infraspinatus - Negative   Hornblower's test for teres minor - Negative   Og Lift-Off test for subscapularis - Negative   Drop Arm Test for RC disruption - Negative   Speeds/Yergasons testing for biceps tendon - Negative   NEUROVASCULAR:  Spurling Maneuver - Negative   Sensation - Normal.  Finger abduction - 5/5.  Thumb opposition - 5/5.  Radial pulses - 2+ .  Capillary refill - Brisk.    ASSESSMENT/ PLAN  1. Tendinitis of right rotator cuff  Consistent with tendonitis. No signs of tear on exam which is reassuring. Recommend home exercises. Avoid aggravating movements. If no improvement, would recommend physical therapy and then if still no improvement ortho. Follow up here PRN.      Patient expresses understanding and agreement with above plan.  Maurizio Ferrara PA-C

## 2024-04-03 ENCOUNTER — OFFICE VISIT (OUTPATIENT)
Dept: FAMILY MEDICINE CLINIC | Facility: CLINIC | Age: 39
End: 2024-04-03
Payer: COMMERCIAL

## 2024-04-03 VITALS
HEIGHT: 71 IN | SYSTOLIC BLOOD PRESSURE: 100 MMHG | HEART RATE: 67 BPM | DIASTOLIC BLOOD PRESSURE: 72 MMHG | WEIGHT: 187 LBS | BODY MASS INDEX: 26.18 KG/M2 | OXYGEN SATURATION: 99 % | TEMPERATURE: 98 F

## 2024-04-03 DIAGNOSIS — K13.0 ANGULAR CHEILITIS: Primary | ICD-10-CM

## 2024-04-03 PROCEDURE — 99213 OFFICE O/P EST LOW 20 MIN: CPT | Performed by: PHYSICIAN ASSISTANT

## 2024-04-03 NOTE — PROGRESS NOTES
Chief Complaint   Patient presents with    Follow - Up     Painful cracks on corners of mouth, previous Carmax would take care of it but not anymore been going on from Mid January until now        HISTORY OF PRESENT ILLNESS  Rhiannon Christianson is a 39 year old female who presents for evaluation of painful, cracked, and dry corners of the mouth for the past 2 months. Previously happened 5 years ago and was relieved with carmax. Has already tried Carmax, Vaseline, and Blistex with no significant relief. Dryness varies but has never completely resolved. Drinking 2-4 glasses of water a day. No changes in products, allergies, eczema.      Current Outpatient Medications:     hydrocortisone 2.5 % External Ointment, Apply 1 Application topically 2 (two) times daily., Disp: 30 g, Rfl: 0    Allergies: Patient has no known allergies.    Patient Active Problem List   Diagnosis    Advanced maternal age in multigravida (HCC)    Pregnancy ([facility])       Past Surgical History:   Procedure Laterality Date    Breast lumpectomy Left 5/21/2016    Procedure: BREAST LUMPECTOMY;  Surgeon: Bj Boyce MD;  Location: [facility]  5/2009       Social History     Socioeconomic History    Marital status:    Occupational History    Occupation: [employer]    Tobacco Use    Smoking status: Never    Smokeless tobacco: Never   Vaping Use    Vaping Use: Never used   Substance and Sexual Activity    Alcohol use: Yes     Alcohol/week: 0.0 - 1.0 standard drinks of alcohol    Drug use: No    Sexual activity: Yes     Partners: Male   Other Topics Concern    Caffeine Concern Yes     Comment: 2 cups of coffee daily     Exercise Yes     Comment: walking-daily    Seat Belt Yes    Self-Exams Yes         Vitals:    04/03/24 0910   BP: 100/72   Pulse: 67   Temp: 98.4 °F (36.9 °C)       PHYSICAL EXAM  GENERAL:  Alert and in no acute distress.  Well groomed and appropriately dressed.  SKIN: Cracked at lip commisures bilaterally (R>L). Appears to  have some chapped lips surrounding. No signs of crusting, drainage, or infection.    Labs:   No visits with results within 1 Week(s) from this visit.   Latest known visit with results is:   [facility] Lab Encounter on 10/22/2021   Component Date Value Ref Range Status    TSH 10/22/2021 2.000  0.358 - 3.740 mIU/mL Final    WBC 10/22/2021 6.9  4.0 - 11.0 x10(3) uL Final    RBC 10/22/2021 4.86  3.80 - 5.30 x10(6)uL Final    HGB 10/22/2021 14.5  12.0 - 16.0 g/dL Final    HCT 10/22/2021 43.2  35.0 - 48.0 % Final    PLT 10/22/2021 216.0  150.0 - 450.0 10(3)uL Final    MCV 10/22/2021 88.9  80.0 - 100.0 fL Final    MCH 10/22/2021 29.8  26.0 - 34.0 pg Final    MCHC 10/22/2021 33.6  31.0 - 37.0 g/dL Final    RDW 10/22/2021 12.4  % Final    Glucose 10/22/2021 92  70 - 99 mg/dL Final    Sodium 10/22/2021 135 (L)  136 - 145 mmol/L Final    Potassium 10/22/2021 4.3  3.5 - 5.1 mmol/L Final    Chloride 10/22/2021 107  98 - 112 mmol/L Final    CO2 10/22/2021 27.0  21.0 - 32.0 mmol/L Final    Anion Gap 10/22/2021 1  0 - 18 mmol/L Final    BUN 10/22/2021 25 (H)  7 - 18 mg/dL Final    Creatinine 10/22/2021 0.87  0.55 - 1.02 mg/dL Final    Calcium, Total 10/22/2021 9.2  8.5 - 10.1 mg/dL Final    Calculated Osmolality 10/22/2021 284  275 - 295 mOsm/kg Final    GFR, Non- 10/22/2021 85  >=60 Final    GFR, -American 10/22/2021 98  >=60 Final    AST 10/22/2021 14 (L)  15 - 37 U/L Final    ALT 10/22/2021 25  13 - 56 U/L Final    Alkaline Phosphatase 10/22/2021 58  37 - 98 U/L Final    Bilirubin, Total 10/22/2021 0.8  0.1 - 2.0 mg/dL Final    Total Protein 10/22/2021 7.7  6.4 - 8.2 g/dL Final    Albumin 10/22/2021 4.3  3.4 - 5.0 g/dL Final    Globulin  10/22/2021 3.4  2.8 - 4.4 g/dL Final    A/G Ratio 10/22/2021 1.3  1.0 - 2.0 Final    FASTING 10/22/2021 Yes   Final    Cholesterol, Total 10/22/2021 163  <200 mg/dL Final    HDL Cholesterol 10/22/2021 35 (L)  40 - 59 mg/dL Final    Triglycerides 10/22/2021 74  30 - 149 mg/dL  Final    LDL Cholesterol 10/22/2021 114 (H)  <100 mg/dL Final    VLDL 10/22/2021 13  0 - 30 mg/dL Final    Non HDL Chol 10/22/2021 128  <130 mg/dL Final    FASTING 10/22/2021 Yes   Final       ASSESSMENT/ PLAN  1. Angular cheilitis  Apply hydrocortisone ointment to corners of mouth twice daily and cover with Vaseline. Notified of side effects of medication. Increase hydration. Avoid licking lips or picking at inflammation. If no improvement in symptoms in 2 weeks, return to office. Follow up if concerns.      Patient expresses understanding and agreement with above plan.  Maurizio Ferrara PA-C

## 2024-04-24 ENCOUNTER — OFFICE VISIT (OUTPATIENT)
Dept: FAMILY MEDICINE CLINIC | Facility: CLINIC | Age: 39
End: 2024-04-24
Payer: COMMERCIAL

## 2024-04-24 VITALS
SYSTOLIC BLOOD PRESSURE: 100 MMHG | HEART RATE: 64 BPM | TEMPERATURE: 98 F | BODY MASS INDEX: 27 KG/M2 | RESPIRATION RATE: 16 BRPM | WEIGHT: 190.19 LBS | DIASTOLIC BLOOD PRESSURE: 62 MMHG

## 2024-04-24 DIAGNOSIS — L60.0 INGROWN TOENAIL: Primary | ICD-10-CM

## 2024-04-24 PROCEDURE — 96127 BRIEF EMOTIONAL/BEHAV ASSMT: CPT | Performed by: PHYSICIAN ASSISTANT

## 2024-04-24 PROCEDURE — 99213 OFFICE O/P EST LOW 20 MIN: CPT | Performed by: PHYSICIAN ASSISTANT

## 2024-04-24 NOTE — PROGRESS NOTES
Chief Complaint   Patient presents with    Ingrown Toenail     On right foot         HISTORY OF PRESENT ILLNESS  Rhiannon Christianson is a 39 year old female who presents for evaluation of ingrown toenail. She has had issues with this for years. Feels that she is getting more pain on the medial aspect of the toenail. Has never been infected before. Tries to wear shoes that accommodate for this. Does home cares as often as she can and still feels that it is getting worse. No other concerns today.      Current Outpatient Medications:     hydrocortisone 2.5 % External Ointment, Apply 1 Application topically 2 (two) times daily., Disp: 30 g, Rfl: 0    Allergies: Patient has no known allergies.    Patient Active Problem List   Diagnosis    Advanced maternal age in multigravida (HCC)    Pregnancy (HCC)       Past Surgical History:   Procedure Laterality Date    Breast lumpectomy Left 5/21/2016    Procedure: BREAST LUMPECTOMY;  Surgeon: Bj Boyce MD;  Location: [facility]  5/2009       Social History     Socioeconomic History    Marital status:    Occupational History    Occupation: [employer]    Tobacco Use    Smoking status: Never    Smokeless tobacco: Never   Vaping Use    Vaping status: Never Used   Substance and Sexual Activity    Alcohol use: Yes     Alcohol/week: 0.0 - 1.0 standard drinks of alcohol    Drug use: No    Sexual activity: Yes     Partners: Male   Other Topics Concern    Caffeine Concern Yes     Comment: 2 cups of coffee daily     Exercise Yes     Comment: walking-daily    Seat Belt Yes    Self-Exams Yes     Comment: breast self exam         Vitals:    04/24/24 0820   BP: 100/62   Pulse: 64   Resp: 16   Temp: 97.5 °F (36.4 °C)       PHYSICAL EXAM  GENERAL:  Alert and in no acute distress.  Well groomed and appropriately dressed.  FEET: Right medial great toenail appears ingrown. No significant erythema, warmth, discharge.     Labs:   No visits with results within 1 Week(s) from this  visit.   Latest known visit with results is:   [facility] Lab Encounter on 10/22/2021   Component Date Value Ref Range Status    TSH 10/22/2021 2.000  0.358 - 3.740 mIU/mL Final    WBC 10/22/2021 6.9  4.0 - 11.0 x10(3) uL Final    RBC 10/22/2021 4.86  3.80 - 5.30 x10(6)uL Final    HGB 10/22/2021 14.5  12.0 - 16.0 g/dL Final    HCT 10/22/2021 43.2  35.0 - 48.0 % Final    PLT 10/22/2021 216.0  150.0 - 450.0 10(3)uL Final    MCV 10/22/2021 88.9  80.0 - 100.0 fL Final    MCH 10/22/2021 29.8  26.0 - 34.0 pg Final    MCHC 10/22/2021 33.6  31.0 - 37.0 g/dL Final    RDW 10/22/2021 12.4  % Final    Glucose 10/22/2021 92  70 - 99 mg/dL Final    Sodium 10/22/2021 135 (L)  136 - 145 mmol/L Final    Potassium 10/22/2021 4.3  3.5 - 5.1 mmol/L Final    Chloride 10/22/2021 107  98 - 112 mmol/L Final    CO2 10/22/2021 27.0  21.0 - 32.0 mmol/L Final    Anion Gap 10/22/2021 1  0 - 18 mmol/L Final    BUN 10/22/2021 25 (H)  7 - 18 mg/dL Final    Creatinine 10/22/2021 0.87  0.55 - 1.02 mg/dL Final    Calcium, Total 10/22/2021 9.2  8.5 - 10.1 mg/dL Final    Calculated Osmolality 10/22/2021 284  275 - 295 mOsm/kg Final    GFR, Non- 10/22/2021 85  >=60 Final    GFR, -American 10/22/2021 98  >=60 Final    AST 10/22/2021 14 (L)  15 - 37 U/L Final    ALT 10/22/2021 25  13 - 56 U/L Final    Alkaline Phosphatase 10/22/2021 58  37 - 98 U/L Final    Bilirubin, Total 10/22/2021 0.8  0.1 - 2.0 mg/dL Final    Total Protein 10/22/2021 7.7  6.4 - 8.2 g/dL Final    Albumin 10/22/2021 4.3  3.4 - 5.0 g/dL Final    Globulin  10/22/2021 3.4  2.8 - 4.4 g/dL Final    A/G Ratio 10/22/2021 1.3  1.0 - 2.0 Final    FASTING 10/22/2021 Yes   Final    Cholesterol, Total 10/22/2021 163  <200 mg/dL Final    HDL Cholesterol 10/22/2021 35 (L)  40 - 59 mg/dL Final    Triglycerides 10/22/2021 74  30 - 149 mg/dL Final    LDL Cholesterol 10/22/2021 114 (H)  <100 mg/dL Final    VLDL 10/22/2021 13  0 - 30 mg/dL Final    Non HDL Chol 10/22/2021 128  <130 mg/dL  Final    FASTING 10/22/2021 Yes   Final       ASSESSMENT/ PLAN  1. Ingrown toenail  At this time, needs to see podiatry for removal. Already attempting home cares. Follow up here PRN.  - Podiatry Referral - In Network      Patient expresses understanding and agreement with above plan.  Maurizio Ferrara PA-C

## 2024-06-12 ENCOUNTER — OFFICE VISIT (OUTPATIENT)
Dept: PODIATRY CLINIC | Facility: CLINIC | Age: 39
End: 2024-06-12

## 2024-06-12 DIAGNOSIS — M79.675 TOE PAIN, BILATERAL: ICD-10-CM

## 2024-06-12 DIAGNOSIS — L60.0 INGROWN NAIL OF GREAT TOE OF LEFT FOOT: Primary | ICD-10-CM

## 2024-06-12 DIAGNOSIS — M79.674 TOE PAIN, BILATERAL: ICD-10-CM

## 2024-06-12 DIAGNOSIS — L60.0 INGROWN NAIL OF GREAT TOE OF RIGHT FOOT: ICD-10-CM

## 2024-06-12 PROCEDURE — 99203 OFFICE O/P NEW LOW 30 MIN: CPT | Performed by: STUDENT IN AN ORGANIZED HEALTH CARE EDUCATION/TRAINING PROGRAM

## 2024-06-14 NOTE — PROGRESS NOTES
[facility] Podiatry  Progress Note    Rhiannon Christianson is a 39 year old female.   Chief Complaint   Patient presents with    Consult     Ingrown nail- bilateral great toe lateral borders. States that it has gotten worse over the last few months. Right great toe is worse. Rates pain at 4/10.          HPI:     Patient is a very pleasant 39-year-old female presents to clinic for evaluation of pain due to ingrown nails to lateral borders of both great toes.  Her right great toe is more painful than her left.  She rates the pain at a 4 out of 10.  She denies any current signs of infection or other concerns.  She would like to know what treatment options are available.  Patient is very active and these do cause pain for her.  No other complaints are mentioned.  Past medical history, medications, and allergies reviewed.      Allergies: Patient has no known allergies.   Current Outpatient Medications   Medication Sig Dispense Refill    hydrocortisone 2.5 % External Ointment Apply 1 Application topically 2 (two) times daily. 30 g 0      Past Medical History:    HPV in female    Moderate dysplasia of cervix      Past Surgical History:   Procedure Laterality Date    Breast lumpectomy Left 5/21/2016    Procedure: BREAST LUMPECTOMY;  Surgeon: Bj Boyce MD;  Location: [facility] OR    [facility]  5/2009      Family History   Adopted: Yes   Problem Relation Age of Onset    Heart Disease Mother     Diabetes Mother     Cancer Maternal Grandmother         not sure what type-deceaased    Other (alzheimeirs) Paternal Grandfather     Stroke Maternal Grandfather     Breast Cancer Other         pggm?      Social History     Socioeconomic History    Marital status:    Occupational History    Occupation: [employer]    Tobacco Use    Smoking status: Never    Smokeless tobacco: Never   Vaping Use    Vaping status: Never Used   Substance and Sexual Activity    Alcohol use: Yes     Alcohol/week: 0.0 - 1.0 standard drinks of alcohol     Drug use: No    Sexual activity: Yes     Partners: Male   Other Topics Concern    Caffeine Concern Yes     Comment: 2 cups of coffee daily     Exercise Yes     Comment: walking-daily    Seat Belt Yes    Self-Exams Yes     Comment: breast self exam           REVIEW OF SYSTEMS:     Today reviewed systems as documented below  GENERAL HEALTH: feels well otherwise  SKIN: denies any unusual skin lesions or rashes  RESPIRATORY: denies shortness of breath with exertion  CARDIOVASCULAR: denies chest pain on exertion  GI: denies abdominal pain and denies heartburn  NEURO: denies headaches  MUSCULO: denies arthritis, back pain      EXAM:   There were no vitals taken for this visit.  GENERAL: well developed, well nourished, in no apparent distress  EXTREMITIES:   1. Integument: Normal skin temperature and turgor.  Lateral border of hallux nails are incurvated bilaterally with the right worse than the left.  No acute signs of infection.  2. Vascular: Dorsalis pedis two out of four bilateral and posterior tibial pulses two out of   four bilateral, capillary refill normal.   3. Musculoskeletal: All muscle groups are graded 5 out of 5 in the foot and ankle.  Pain noted ingrown nail borders of both great toes.   4. Neurological: Normal sharp dull sensation; reflexes normal.          ASSESSMENT AND PLAN:   Diagnoses and all orders for this visit:    Ingrown nail of great toe of left foot    Ingrown nail of great toe of right foot    Toe pain, bilateral        Plan:    -Patient examined, chart history reviewed.  -Discussed etiology of condition and various treatment options.  -Discussed slant back procedure versus matrixectomy procedure to help treat patient's symptoms.  Discussed pros and cons of each option.  Patient would like to try to hold off on matrixectomy procedure at this time.  -Slant back procedure performed to lateral borders of right and left great toes with nail nipper without incident.  Debris was curetted free with  dermal curette.  Can implement Epsom salt soaks and apply bacitracin to site as they heal.  Hopefully this provides good relief with patient.  Discussed with patient that I would recommend she schedule visit in future for matrixectomy procedure in the event that these symptoms do not improve.  -All questions were answered to satisfaction.  Can follow-up as needed for possible matrixectomy procedure moving forward.    The patient indicates understanding of these issues and agrees to the plan.        AURE[employer] speech recognition software was used to prepare this note.  Errors in word recognition may occur.  Please contact me with any questions/concerns with this note.

## 2024-07-30 ENCOUNTER — OFFICE VISIT (OUTPATIENT)
Dept: PODIATRY CLINIC | Facility: CLINIC | Age: 39
End: 2024-07-30

## 2024-07-30 DIAGNOSIS — M79.674 TOE PAIN, RIGHT: ICD-10-CM

## 2024-07-30 DIAGNOSIS — L60.0 INGROWN NAIL OF GREAT TOE OF RIGHT FOOT: Primary | ICD-10-CM

## 2024-07-30 PROCEDURE — 11750 EXCISION NAIL&NAIL MATRIX: CPT | Performed by: STUDENT IN AN ORGANIZED HEALTH CARE EDUCATION/TRAINING PROGRAM

## 2024-07-30 RX ORDER — CEPHALEXIN 500 MG/1
500 CAPSULE ORAL 2 TIMES DAILY
Qty: 14 CAPSULE | Refills: 0 | Status: SHIPPED | OUTPATIENT
Start: 2024-07-30

## 2024-07-30 NOTE — PROGRESS NOTES
[facility] Podiatry  Progress Note    Rhiannon Christianson is a 39 year old female.   Chief Complaint   Patient presents with    Ingrown Toenail     Right hallux ingrown- pain 2/10         HPI:     Patient is a very pleasant 39-year-old female presents to clinic for evaluation of pain due to ingrown nails to lateral borders of both great toes.  She relates that her left great toe is doing better but she does have some persistent pain to the lateral border of her right great toe.  She rates the pain a 2 out of 10.  She is hoping for permanent nail procedure to the site today.  No other complaints are mentioned.  Past medical history, medications, and allergies reviewed.      Allergies: Patient has no known allergies.   Current Outpatient Medications   Medication Sig Dispense Refill    hydrocortisone 2.5 % External Ointment Apply 1 Application topically 2 (two) times daily. 30 g 0      Past Medical History:    HPV in female    Moderate dysplasia of cervix      Past Surgical History:   Procedure Laterality Date    Breast lumpectomy Left 5/21/2016    Procedure: BREAST LUMPECTOMY;  Surgeon: Bj Boyce MD;  Location: [facility] OR    [facility]  5/2009      Family History   Adopted: Yes   Problem Relation Age of Onset    Heart Disease Mother     Diabetes Mother     Cancer Maternal Grandmother         not sure what type-deceaased    Other (alzheimeirs) Paternal Grandfather     Stroke Maternal Grandfather     Breast Cancer Other         pggm?      Social History     Socioeconomic History    Marital status:    Occupational History    Occupation: [email or web address]    Tobacco Use    Smoking status: Never    Smokeless tobacco: Never   Vaping Use    Vaping status: Never Used   Substance and Sexual Activity    Alcohol use: Yes     Alcohol/week: 0.0 - 1.0 standard drinks of alcohol    Drug use: No    Sexual activity: Yes     Partners: Male   Other Topics Concern    Caffeine Concern Yes     Comment: 2 cups of coffee daily      Exercise Yes     Comment: walking-daily    Seat Belt Yes    Self-Exams Yes     Comment: breast self exam           REVIEW OF SYSTEMS:     Today reviewed systems as documented below  GENERAL HEALTH: feels well otherwise  SKIN: denies any unusual skin lesions or rashes  RESPIRATORY: denies shortness of breath with exertion  CARDIOVASCULAR: denies chest pain on exertion  GI: denies abdominal pain and denies heartburn  NEURO: denies headaches  MUSCULO: denies arthritis, back pain      EXAM:   There were no vitals taken for this visit.  GENERAL: well developed, well nourished, in no apparent distress  EXTREMITIES:   1. Integument: Normal skin temperature and turgor.  Lateral border of right hallux nail is incurvated with minor erythema and pain to site.  2. Vascular: Dorsalis pedis two out of four bilateral and posterior tibial pulses two out of   four bilateral, capillary refill normal.   3. Musculoskeletal: All muscle groups are graded 5 out of 5 in the foot and ankle.  Pain noted ingrown nail borders of both great toes.   4. Neurological: Normal sharp dull sensation; reflexes normal.          ASSESSMENT AND PLAN:   There are no diagnoses linked to this encounter.      Plan:    -Patient examined, chart history reviewed.  -Discussed etiology of patient's condition and various treatment options.  -Recommend nail matrixectomy to lateral border of right great toe at this time given persistent ingrown nail to site.  Discussed procedure in detail with patient  including benefits, risks, and recovery period.  Patient agreeable with procedure and written consent was obtained.    Procedure as follows:  After prepping site with alcohol, administered local infiltrative block to right hallux consisting of 5 cc of 1:1 mixture of 0.5% Marcaine plain and 1% lidocaine plain.  After sufficient anesthesia was achieved, the digit was prepped with Betadine.  Next, using a hemostat, the lateral border of the right  hallux nail was freed.   An English anvil was then used to cut the incurvated portion of the nail down to the nail matrix.  A hemostat was once again used to remove the incurvated portion of the nail in its entirety.  The site was then copiously irrigated with saline and patted dry.  Next, digital tourniquet was applied.  Healthy skin was protected with bacitracin.  Next, 3 x 30 seconds applications of 89% phenol were applied per standard technique.  The site was then copiously irrigated and patted dry.  The digital tourniquet was removed and prompt hyperemic response was noted to the digit.  The site was dressed with bacitracin, gauze, and mildly compressive Coban wrap.    -All soaking and aftercare instructions were discussed with the patient.  Informational handout was dispensed.  -Rx Keflex.  -Educated patient on acute signs of infection advised patient to seek immediate medical attention if any concerns arise.     The patient indicates understanding of these issues and agrees to the plan.    RTC 1 to 2 weeks.    Nicholas Soria DPM    Dragon speech recognition software was used to prepare this note.  Errors in word recognition may occur.  Please contact me with any questions/concerns with this note.

## 2024-07-31 VITALS — DIASTOLIC BLOOD PRESSURE: 74 MMHG | SYSTOLIC BLOOD PRESSURE: 118 MMHG

## 2024-07-31 NOTE — PROGRESS NOTES
Per Dr block to draw up .2.5ml of 1% Lidocaine and 2.5ml marcaine 0.5% for a right hallux Ingrown Toenail Procedure.

## 2024-08-05 ENCOUNTER — TELEPHONE (OUTPATIENT)
Dept: PODIATRY CLINIC | Facility: CLINIC | Age: 39
End: 2024-08-05

## 2024-08-05 NOTE — TELEPHONE ENCOUNTER
Per patient had procedure done on 7/30, was advised to return in 1-2 weeks, appointment was made with Dr. Bennett by office on 9/6. Patient states she needs to be seen sooner and unsure why she was scheduled with another provider. Please call thank you.

## 2024-08-05 NOTE — TELEPHONE ENCOUNTER
Called patient and per Dr Soria's note she needs 1-2 wk Follow up appointment. Canceled the 9/6 appointment with Dr Bennett. Offered appointment next wk for 10am but patient cannot come in sooner than 4pm on any day. Next 4pm availability is 3 wks from procedure on 8/21 at 4:45pm. Appointment booked. Advised to call office sooner if any concerns.

## 2024-08-21 ENCOUNTER — OFFICE VISIT (OUTPATIENT)
Dept: PODIATRY CLINIC | Facility: CLINIC | Age: 39
End: 2024-08-21

## 2024-08-21 DIAGNOSIS — L60.0 INGROWN NAIL OF GREAT TOE OF RIGHT FOOT: Primary | ICD-10-CM

## 2024-08-21 PROCEDURE — 99212 OFFICE O/P EST SF 10 MIN: CPT | Performed by: STUDENT IN AN ORGANIZED HEALTH CARE EDUCATION/TRAINING PROGRAM

## 2024-08-21 NOTE — PROGRESS NOTES
[facility] Podiatry  Progress Note    Rhiannon Christianson is a 39 year old female.   Chief Complaint   Patient presents with    Follow - Up     Follow up right great toe - denies pain - No redness - No drainage         HPI:     Patient is a very pleasant 39-year-old female presents to clinic for evaluation of pain due to ingrown nails to lateral border of right great toe.  She had matrixectomy procedure at last visit and is tolerating this well.  She relates the site appears to be healing nicely.  Denies pain or drainage.       Allergies: Patient has no known allergies.   Current Outpatient Medications   Medication Sig Dispense Refill    cephalexin (KEFLEX) 500 MG Oral Cap Take 1 capsule (500 mg total) by mouth 2 (two) times daily. 14 capsule 0    hydrocortisone 2.5 % External Ointment Apply 1 Application topically 2 (two) times daily. 30 g 0      Past Medical History:    HPV in female    Moderate dysplasia of cervix      Past Surgical History:   Procedure Laterality Date    Breast lumpectomy Left 5/21/2016    Procedure: BREAST LUMPECTOMY;  Surgeon: Bj Boyce MD;  Location:  MAIN OR    [facility]  5/2009      Family History   Adopted: Yes   Problem Relation Age of Onset    Heart Disease Mother     Diabetes Mother     Cancer Maternal Grandmother         not sure what type-deceaased    Other (alzheimeirs) Paternal Grandfather     Stroke Maternal Grandfather     Breast Cancer Other         pggm?      Social History     Socioeconomic History    Marital status:    Occupational History    Occupation: [employer]    Tobacco Use    Smoking status: Never    Smokeless tobacco: Never   Vaping Use    Vaping status: Never Used   Substance and Sexual Activity    Alcohol use: Yes     Alcohol/week: 0.0 - 1.0 standard drinks of alcohol    Drug use: No    Sexual activity: Yes     Partners: Male   Other Topics Concern    Caffeine Concern Yes     Comment: 2 cups of coffee daily     Exercise Yes     Comment: walking-daily     Seat Belt Yes    Self-Exams Yes     Comment: breast self exam           REVIEW OF SYSTEMS:     Today reviewed systems as documented below  GENERAL HEALTH: feels well otherwise  SKIN: denies any unusual skin lesions or rashes  RESPIRATORY: denies shortness of breath with exertion  CARDIOVASCULAR: denies chest pain on exertion  GI: denies abdominal pain and denies heartburn  NEURO: denies headaches  MUSCULO: denies arthritis, back pain      EXAM:   There were no vitals taken for this visit.  GENERAL: well developed, well nourished, in no apparent distress  EXTREMITIES:   1. Integument: Normal skin temperature and turgor.  Lateral border of right hallux nail avulsion is healing well without acute signs of infection.  Eschar/bioburden noted.  2. Vascular: Dorsalis pedis two out of four bilateral and posterior tibial pulses two out of   four bilateral, capillary refill normal.   3. Musculoskeletal: All muscle groups are graded 5 out of 5 in the foot and ankle.  Pain noted ingrown nail borders of both great toes.   4. Neurological: Normal sharp dull sensation; reflexes normal.          ASSESSMENT AND PLAN:   Diagnoses and all orders for this visit:    Ingrown nail of great toe of right foot          Plan:    -Patient examined, chart history reviewed.  -Inspected site of matrixectomy procedure.  Noted peeling well without concerns at this time.  Debris and eschar curetted free with dermal curette without incident.  Site dressed with bacitracin and Band-Aid.  Can continue soaking aftercare for another couple days and then discontinue as tolerated.  Can follow-up as needed if pain, recurrence or other concerns arise moving forward.    AURE[employer] Andre speech recognition software was used to prepare this note.  Errors in word recognition may occur.  Please contact me with any questions/concerns with this note.

## 2024-09-09 ENCOUNTER — TELEPHONE (OUTPATIENT)
Dept: FAMILY MEDICINE CLINIC | Facility: CLINIC | Age: 39
End: 2024-09-09

## 2024-09-09 DIAGNOSIS — Z00.00 ROUTINE GENERAL MEDICAL EXAMINATION AT A HEALTH CARE FACILITY: Primary | ICD-10-CM

## 2024-09-09 NOTE — TELEPHONE ENCOUNTER
Please enter lab orders for the patient's upcoming physical appointment.     Physical scheduled:   Your appointments       Date & Time Appointment Department ([place])    Oct 02, 2024 7:30 AM CDT Physical - Established with Maurizio Ferrara [facility] ([facility])              [facility]  [address] [place] [id number]  [phone]           Preferred lab: [facility] ([facility])     The patient has been notified to complete fasting labs prior to their physical appointment.

## 2024-10-30 ENCOUNTER — TELEPHONE (OUTPATIENT)
Dept: FAMILY MEDICINE CLINIC | Facility: CLINIC | Age: 39
End: 2024-10-30

## 2024-10-30 NOTE — TELEPHONE ENCOUNTER
Please enter lab orders for the patient's upcoming physical appointment.     Physical scheduled:   Your appointments       Date & Time Appointment Department ([place])    Nov 20, 2024 9:00 AM CST Physical - Established with Maurizio Ferrara PA [facility] ([facility])              [facility]  [address]  [facility] [address]  [phone]           Preferred lab: [facility] LAB ([facility])     The patient has been notified to complete fasting labs prior to their physical appointment.

## 2024-11-20 ENCOUNTER — TELEPHONE (OUTPATIENT)
Dept: FAMILY MEDICINE CLINIC | Facility: CLINIC | Age: 39
End: 2024-11-20

## 2024-11-20 NOTE — TELEPHONE ENCOUNTER
Patient has had three no shows since beginning of October 2024. Please send letter addressing this.    Thank you,  Maurizio

## 2024-12-19 ENCOUNTER — OFFICE VISIT (OUTPATIENT)
Dept: FAMILY MEDICINE CLINIC | Facility: CLINIC | Age: 39
End: 2024-12-19
Payer: COMMERCIAL

## 2024-12-19 VITALS
DIASTOLIC BLOOD PRESSURE: 68 MMHG | HEIGHT: 68 IN | OXYGEN SATURATION: 99 % | BODY MASS INDEX: 26.83 KG/M2 | WEIGHT: 177 LBS | HEART RATE: 68 BPM | RESPIRATION RATE: 16 BRPM | TEMPERATURE: 98 F | SYSTOLIC BLOOD PRESSURE: 110 MMHG

## 2024-12-19 DIAGNOSIS — Z12.83 SKIN EXAM, SCREENING FOR CANCER: ICD-10-CM

## 2024-12-19 DIAGNOSIS — Z00.00 WELL ADULT EXAM: Primary | ICD-10-CM

## 2024-12-19 PROCEDURE — 99396 PREV VISIT EST AGE 40-64: CPT | Performed by: PHYSICIAN ASSISTANT

## 2024-12-24 ENCOUNTER — LAB ENCOUNTER (OUTPATIENT)
Dept: LAB | Age: 39
End: 2024-12-24
Attending: PHYSICIAN ASSISTANT
Payer: COMMERCIAL

## 2024-12-24 DIAGNOSIS — Z00.00 ROUTINE GENERAL MEDICAL EXAMINATION AT A HEALTH CARE FACILITY: ICD-10-CM

## 2024-12-24 LAB
ALBUMIN SERPL-MCNC: 4.7 G/DL (ref 3.2–4.8)
ALBUMIN/GLOB SERPL: 2 {RATIO} (ref 1–2)
ALP LIVER SERPL-CCNC: 47 U/L
ALT SERPL-CCNC: 24 U/L
ANION GAP SERPL CALC-SCNC: 7 MMOL/L (ref 0–18)
AST SERPL-CCNC: 24 U/L (ref ?–34)
BILIRUB SERPL-MCNC: 0.8 MG/DL (ref 0.3–1.2)
BUN BLD-MCNC: 17 MG/DL (ref 9–23)
CALCIUM BLD-MCNC: 9.6 MG/DL (ref 8.7–10.4)
CHLORIDE SERPL-SCNC: 108 MMOL/L (ref 98–112)
CHOLEST SERPL-MCNC: 162 MG/DL (ref ?–200)
CO2 SERPL-SCNC: 27 MMOL/L (ref 21–32)
CREAT BLD-MCNC: 0.81 MG/DL
EGFRCR SERPLBLD CKD-EPI 2021: 94 ML/MIN/1.73M2 (ref 60–?)
ERYTHROCYTE [DISTWIDTH] IN BLOOD BY AUTOMATED COUNT: 11.9 %
FASTING PATIENT LIPID ANSWER: YES
FASTING STATUS PATIENT QL REPORTED: YES
GLOBULIN PLAS-MCNC: 2.3 G/DL (ref 2–3.5)
GLUCOSE BLD-MCNC: 91 MG/DL (ref 70–99)
HCT VFR BLD AUTO: 40 %
HDLC SERPL-MCNC: 45 MG/DL (ref 40–59)
HGB BLD-MCNC: 13.4 G/DL
LDLC SERPL CALC-MCNC: 104 MG/DL (ref ?–100)
MCH RBC QN AUTO: 30.6 PG (ref 26–34)
MCHC RBC AUTO-ENTMCNC: 33.5 G/DL (ref 31–37)
MCV RBC AUTO: 91.3 FL
NONHDLC SERPL-MCNC: 117 MG/DL (ref ?–130)
OSMOLALITY SERPL CALC.SUM OF ELEC: 295 MOSM/KG (ref 275–295)
PLATELET # BLD AUTO: 226 10(3)UL (ref 150–450)
POTASSIUM SERPL-SCNC: 4.1 MMOL/L (ref 3.5–5.1)
PROT SERPL-MCNC: 7 G/DL (ref 5.7–8.2)
RBC # BLD AUTO: 4.38 X10(6)UL
SODIUM SERPL-SCNC: 142 MMOL/L (ref 136–145)
TRIGL SERPL-MCNC: 69 MG/DL (ref 30–149)
TSI SER-ACNC: 2.3 UIU/ML (ref 0.55–4.78)
VLDLC SERPL CALC-MCNC: 12 MG/DL (ref 0–30)
WBC # BLD AUTO: 6.3 X10(3) UL (ref 4–11)

## 2024-12-24 PROCEDURE — 80053 COMPREHEN METABOLIC PANEL: CPT

## 2024-12-24 PROCEDURE — 36415 COLL VENOUS BLD VENIPUNCTURE: CPT

## 2024-12-24 PROCEDURE — 84443 ASSAY THYROID STIM HORMONE: CPT

## 2024-12-24 PROCEDURE — 80061 LIPID PANEL: CPT

## 2024-12-24 PROCEDURE — 85027 COMPLETE CBC AUTOMATED: CPT

## 2024-12-24 NOTE — PROGRESS NOTES
DATE OF EXAM  12/19/2024    CHIEF COMPLAINT/REASON FOR VISIT  Annual exam.    HISTORY OF PRESENT ILLNESS  Rhiannon Christianson presents today for routine annual physical examination.  Doing well overall! She has been active with walking, cycling, resistance bands. Tries to limit sugars and red meats. No significant concerns today.    Medications Ordered Prior to Encounter[1]    Patient has no known allergies.     Patient Active Problem List   Diagnosis    Advanced maternal age in multigravida (HCC)    Pregnancy (HCC)       Past Surgical History:   Procedure Laterality Date    Breast lumpectomy Left 5/21/2016    Procedure: BREAST LUMPECTOMY;  Surgeon: Bj Boyce MD;  Location: [facility]  5/2009       Social History     Socioeconomic History    Marital status:    Occupational History    Occupation: [employer]    Tobacco Use    Smoking status: Never    Smokeless tobacco: Never   Vaping Use    Vaping status: Never Used   Substance and Sexual Activity    Alcohol use: Yes     Alcohol/week: 0.0 - 1.0 standard drinks of alcohol    Drug use: No    Sexual activity: Yes     Partners: Male   Other Topics Concern    Caffeine Concern Yes     Comment: 2 cups of coffee daily     Exercise Yes     Comment: walking-daily    Seat Belt Yes    Self-Exams Yes     Comment: breast self exam       Family History   Adopted: Yes   Problem Relation Age of Onset    Heart Disease Mother     Diabetes Mother     Cancer Maternal Grandmother         not sure what type-deceaased    Stroke Maternal Grandfather     No Known Problems Paternal Grandmother     Other (alzheimeirs) Paternal Grandfather     Breast Cancer Other         pggm?       Health maintenance:  Health Maintenance   Topic Date Due    Annual Physical  Never done    Mammogram  02/16/2025    Pap Smear  01/05/2027    DTaP,Tdap,and Td Vaccines (3 - Td or Tdap) 01/30/2029    Influenza Vaccine  Completed    Annual Depression Screening  Completed    COVID-19 Vaccine   Completed    Pneumococcal Vaccine: Birth to 64yrs  Aged Out       PHYSICAL EXAMINATION  Blood pressure 110/68, pulse 68, temperature 97.7 °F (36.5 °C), resp. rate 16, height 5' 8\" (1.727 m), weight 177 lb (80.3 kg), SpO2 99%, not currently breastfeeding.   Body mass index is 26.91 kg/m².    GENERAL: Well-nourished, well-developed 39 year old year old in no apparent distress.  Awake, alert, age appropriate.  SKIN:  Warm, pink, and dry.  No rashes, excoriations, open areas.  HEENT: Head is normocephalic, atraumatic.  Bilateral pupils are equal, reactive to light, and accommodation. EOMs intact. Conjunctivae and sclera are clear.  Bilateral external ears nontender to manipulation with clear canals. Bilateral TMs normal.    Hearing is grossly normal.  Nares patent with normal mucosa.   Oropharynx pink and moist without exudate or erythema.  NECK: Supple without lymphadenopathy.  BREASTS: Sees gyn  CARDIOVASCULAR: Regular rate and rhythm with no murmurs, rubs, or gallops.  LUNGS: Normal effort on room air.  Clear to auscultation throughout.  ABDOMEN:  Bowel sounds present throughout. Rounded, soft, without tenderness to palpation with no organomegaly.  MUSCULOSKELETAL: Normal active range of motion in all extremities. 5/5 strength in upper and lower extremities, equal bilaterally.   NEUROLOGIC: Alert and oriented x 3.   PSYCH: Mood and affect normal.    IMPRESSION/REPORT/PLAN    1.Routine physical examination:  Patient is doing well overall. Discussed age appropriate health topics including: regular exercise, balanced diet, sunscreen, monitoring moles, adequate calcium and vitamin D intake. Health maintenance is up to date as shown above.  Recommend complete physical exams every year.      2. Skin exam, screening for cancer  - Derm Referral - In Network      Patient expresses understanding and agreement with the above plan.   Maurizio Ferrara PA-C'       [1]   Current Outpatient Medications on File Prior to Visit    Medication Sig Dispense Refill    hydrocortisone 2.5 % External Ointment Apply 1 Application topically 2 (two) times daily. 30 g 0     No current facility-administered medications on file prior to visit.

## 2024-12-26 ENCOUNTER — TELEPHONE (OUTPATIENT)
Dept: FAMILY MEDICINE CLINIC | Facility: CLINIC | Age: 39
End: 2024-12-26

## 2024-12-26 DIAGNOSIS — N63.42 LUMP IN CENTRAL PORTION OF LEFT BREAST: Primary | ICD-10-CM

## 2024-12-26 NOTE — TELEPHONE ENCOUNTER
Patient has found a lump under her left breast.  She is requesting an appointment with Dr. Cochran sooner than later, and possibly order a scan.    Please advise.

## 2024-12-26 NOTE — TELEPHONE ENCOUNTER
Lump near the areola rubbery ball shaped that can be moved no pain she has a lumpectomy in the same area 8 years ago when she was pregnant. Want so know if she should be seen or get a mammogram first

## 2025-01-07 ENCOUNTER — HOSPITAL ENCOUNTER (OUTPATIENT)
Dept: MAMMOGRAPHY | Facility: HOSPITAL | Age: 40
Discharge: HOME | End: 2025-01-07
Attending: PHYSICIAN ASSISTANT
Payer: COMMERCIAL

## 2025-01-07 DIAGNOSIS — N63.42 LUMP IN CENTRAL PORTION OF LEFT BREAST: ICD-10-CM

## 2025-01-07 PROCEDURE — 77066 DX MAMMO INCL CAD BI: CPT | Performed by: PHYSICIAN ASSISTANT

## 2025-01-07 PROCEDURE — 77062 BREAST TOMOSYNTHESIS BI: CPT | Performed by: PHYSICIAN ASSISTANT

## 2025-01-07 PROCEDURE — 76641 ULTRASOUND BREAST COMPLETE: CPT | Performed by: PHYSICIAN ASSISTANT

## 2025-06-17 ENCOUNTER — OFFICE VISIT (OUTPATIENT)
Dept: PODIATRY CLINIC | Facility: CLINIC | Age: 40
End: 2025-06-17

## 2025-06-17 DIAGNOSIS — M79.675 TOE PAIN, LEFT: ICD-10-CM

## 2025-06-17 DIAGNOSIS — L03.032 PARONYCHIA OF GREAT TOE, LEFT: Primary | ICD-10-CM

## 2025-06-19 ENCOUNTER — OFFICE VISIT (OUTPATIENT)
Dept: PODIATRY CLINIC | Facility: CLINIC | Age: 40
End: 2025-06-19

## 2025-06-19 VITALS — DIASTOLIC BLOOD PRESSURE: 64 MMHG | SYSTOLIC BLOOD PRESSURE: 128 MMHG

## 2025-06-19 DIAGNOSIS — L60.0 INGROWN NAIL OF GREAT TOE OF LEFT FOOT: Primary | ICD-10-CM

## 2025-06-19 DIAGNOSIS — M79.675 TOE PAIN, LEFT: ICD-10-CM

## 2025-06-19 PROCEDURE — 99213 OFFICE O/P EST LOW 20 MIN: CPT | Performed by: STUDENT IN AN ORGANIZED HEALTH CARE EDUCATION/TRAINING PROGRAM

## 2025-06-19 PROCEDURE — 3074F SYST BP LT 130 MM HG: CPT | Performed by: STUDENT IN AN ORGANIZED HEALTH CARE EDUCATION/TRAINING PROGRAM

## 2025-06-19 PROCEDURE — 3078F DIAST BP <80 MM HG: CPT | Performed by: STUDENT IN AN ORGANIZED HEALTH CARE EDUCATION/TRAINING PROGRAM

## 2025-06-19 RX ORDER — CEPHALEXIN 500 MG/1
500 CAPSULE ORAL 2 TIMES DAILY
Qty: 14 CAPSULE | Refills: 0 | Status: SHIPPED | OUTPATIENT
Start: 2025-06-19

## 2025-06-19 NOTE — PROGRESS NOTES
Per Dr block to draw up .2.5ml of 1% Lidocaine and 2.5ml marcaine 0.5% for a left hallux Ingrown  Toenail Procedure.

## 2025-06-23 NOTE — PROGRESS NOTES
[facility] Podiatry  Progress Note    Rhiannon Christianson is a 40 year old female.   Chief Complaint   Patient presents with    Follow - Up     Left foot great toe ingrown, denies pain          HPI:     Patient is a pleasant 40-year-old female who presents to clinic for evaluation of ingrown nails to the medial and lateral borders of her left great toe.  She is well-known to me as I perform matrixectomy procedure to her right great toe nail borders in the past which is healed uneventfully.  She is hoping for similar procedure to her left foot.      Allergies: Patient has no known allergies.   Current Medications[1]   Past Medical History[2]   Past Surgical History[3]   Family History[4]   Social Hx on file[5]        REVIEW OF SYSTEMS:     No n/v/f/c.      EXAM:   There were no vitals taken for this visit.  GENERAL: well developed, well nourished, in no apparent distress  EXTREMITIES:   1. Integument: Normal skin temperature and turgor.  Medial and lateral borders of left hallux nail are incurvated and painful.    2. Vascular: Dorsalis pedis two out of four bilateral and posterior tibial pulses two out of   four bilateral, capillary refill normal.   3. Musculoskeletal: All muscle groups are graded 5 out of 5 in the foot and ankle. Pain noted to medial lateral borders of left hallux nail.   4. Neurological: Normal sharp dull sensation; reflexes normal.        ASSESSMENT AND PLAN:   Diagnoses and all orders for this visit:    Paronychia of great toe, left    Toe pain, left        Plan:    -Patient examined, chart history reviewed.  -Discussed etiology of condition and various treatment options.  - Slant back procedure performed remaining lateral borders of left great toe with nail nipper.  Will have patient return for procedure visit on Thursday.  - All questions answered to satisfaction.  Appreciate the opportunity participate in patient's care    The patient indicates understanding of these issues and agrees to the  plan.        Nicholas Soria DPM    Dragon speech recognition software was used to prepare this note.  Errors in word recognition may occur.  Please contact me with any questions/concerns with this note.         [1]   Current Outpatient Medications   Medication Sig Dispense Refill    cephALEXin 500 MG Oral Cap Take 1 capsule (500 mg total) by mouth 2 (two) times daily. 14 capsule 0    hydrocortisone 2.5 % External Ointment Apply 1 Application topically 2 (two) times daily. 30 g 0   [2]   Past Medical History:   HPV in female    Moderate dysplasia of cervix   [3]   Past Surgical History:  Procedure Laterality Date    Breast lumpectomy Left 05/21/2016    Procedure: BREAST LUMPECTOMY;  Surgeon: Bj Boyce MD;  Location:  MAIN OR    [facility]  05/2009    Adrienne localization wire 1 site left (cpt=19281) Left 2016    EXC FOR AN ABSCESS   [4]   Family History  Adopted: Yes   Problem Relation Age of Onset    Heart Disease Mother     Diabetes Mother     Cancer Maternal Grandmother         not sure what type-deceaased    Stroke Maternal Grandfather     No Known Problems Paternal Grandmother     Other (alzheimeirs) Paternal Grandfather     Breast Cancer Other         pggm?   [5]   Social History  Socioeconomic History    Marital status:    Occupational History    Occupation: [employer]    Tobacco Use    Smoking status: Never    Smokeless tobacco: Never   Vaping Use    Vaping status: Never Used   Substance and Sexual Activity    Alcohol use: Yes     Alcohol/week: 0.0 - 1.0 standard drinks of alcohol    Drug use: No    Sexual activity: Yes     Partners: Male   Other Topics Concern    Caffeine Concern Yes     Comment: 2 cups of coffee daily     Exercise Yes     Comment: walking-daily    Seat Belt Yes    Self-Exams Yes     Comment: breast self exam

## 2025-06-23 NOTE — PROGRESS NOTES
[facility] Podiatry  Progress Note    Rhiannon Christianson is a 40 year old female.   Chief Complaint   Patient presents with    Procedure     Left hallux PNA         HPI:     Patient is a very pleasant 40-year-old female presents to clinic for evaluation of pain due to ingrown nails to bilateral borders of her left great toe. She is hoping for permanent nail procedure to the site today.  No other complaints are mentioned.  Past medical history, medications, and allergies reviewed.      Allergies: Patient has no known allergies.   Current Outpatient Medications   Medication Sig Dispense Refill    cephALEXin 500 MG Oral Cap Take 1 capsule (500 mg total) by mouth 2 (two) times daily. 14 capsule 0    hydrocortisone 2.5 % External Ointment Apply 1 Application topically 2 (two) times daily. 30 g 0      Past Medical History:    HPV in female    Moderate dysplasia of cervix      Past Surgical History:   Procedure Laterality Date    Breast lumpectomy Left 05/21/2016    Procedure: BREAST LUMPECTOMY;  Surgeon: Bj Boyce MD;  Location:  MAIN OR    [facility]  05/2009    Adrienne localization wire 1 site left (cpt=19281) Left 2016    EXC FOR AN ABSCESS      Family History   Adopted: Yes   Problem Relation Age of Onset    Heart Disease Mother     Diabetes Mother     Cancer Maternal Grandmother         not sure what type-deceaased    Stroke Maternal Grandfather     No Known Problems Paternal Grandmother     Other (alzheimeirs) Paternal Grandfather     Breast Cancer Other         pggm?      Social History     Socioeconomic History    Marital status:    Occupational History    Occupation: [employer]    Tobacco Use    Smoking status: Never    Smokeless tobacco: Never   Vaping Use    Vaping status: Never Used   Substance and Sexual Activity    Alcohol use: Yes     Alcohol/week: 0.0 - 1.0 standard drinks of alcohol    Drug use: No    Sexual activity: Yes     Partners: Male   Other Topics Concern    Caffeine Concern Yes      Comment: 2 cups of coffee daily     Exercise Yes     Comment: walking-daily    Seat Belt Yes    Self-Exams Yes     Comment: breast self exam           REVIEW OF SYSTEMS:     No n/v/f/c.      EXAM:   /64 (BP Location: Right arm, Patient Position: Sitting, Cuff Size: adult)   GENERAL: well developed, well nourished, in no apparent distress  EXTREMITIES:   1. Integument: Normal skin temperature and turgor. Medial and lateral borders of left hallux nail are incurvated and painful.  2. Vascular: Dorsalis pedis two out of four bilateral and posterior tibial pulses two out of   four bilateral, capillary refill normal.   3. Musculoskeletal: All muscle groups are graded 5 out of 5 in the foot and ankle.  Pain noted ingrown nail borders of left great toe.   4. Neurological: Normal sharp dull sensation; reflexes normal.          ASSESSMENT AND PLAN:   Diagnoses and all orders for this visit:    Ingrown nail of great toe of left foot    Toe pain, left    Other orders  -     cephALEXin 500 MG Oral Cap; Take 1 capsule (500 mg total) by mouth 2 (two) times daily.          Plan:    -Patient examined, chart history reviewed.  -Discussed etiology of patient's condition and various treatment options.  -Recommend nail matrixectomy to medial and lateral borders of left great toe at this time given persistent ingrown nail to site.  Discussed procedure in detail with patient  including benefits, risks, and recovery period.  Patient agreeable with procedure and written consent was obtained.    Procedure as follows:  After prepping site with alcohol, administered local infiltrative block to left hallux consisting of 5 cc of 1:1 mixture of 0.5% Marcaine plain and 1% lidocaine plain.  After sufficient anesthesia was achieved, the digit was prepped with Betadine.  Next, using a hemostat, the medial and lateral borders of the left hallux nail was freed.  An English anvil was then used to cut the incurvated portion of the nail down to the  nail matrix.  A hemostat was once again used to remove the incurvated portion of the nail in its entirety.  The site was then copiously irrigated with saline and patted dry.  Next, digital tourniquet was applied.  Healthy skin was protected with bacitracin.  The borders were prepped with a curette. Next, 2 x 45 seconds applications of 89% phenol were applied per standard technique.  The sites were then copiously irrigated and patted dry.  The digital tourniquet was removed and prompt hyperemic response was noted to the digit.  The site was dressed with bacitracin, gauze, and mildly compressive Coban wrap.    -All soaking and aftercare instructions were discussed with the patient.  Informational handout was dispensed.  -Rx Keflex.  -Educated patient on acute signs of infection advised patient to seek immediate medical attention if any concerns arise.     The patient indicates understanding of these issues and agrees to the plan.    RTC 1 to 2 weeks.    Nicholas Soria DPM    Dragon speech recognition software was used to prepare this note.  Errors in word recognition may occur.  Please contact me with any questions/concerns with this note.

## 2025-06-30 ENCOUNTER — OFFICE VISIT (OUTPATIENT)
Dept: PODIATRY CLINIC | Facility: CLINIC | Age: 40
End: 2025-06-30

## 2025-06-30 DIAGNOSIS — M79.675 TOE PAIN, LEFT: ICD-10-CM

## 2025-06-30 DIAGNOSIS — L60.0 INGROWN NAIL OF GREAT TOE OF LEFT FOOT: Primary | ICD-10-CM

## 2025-06-30 PROCEDURE — 99212 OFFICE O/P EST SF 10 MIN: CPT | Performed by: STUDENT IN AN ORGANIZED HEALTH CARE EDUCATION/TRAINING PROGRAM

## 2025-07-04 NOTE — PROGRESS NOTES
[facility] Podiatry  Progress Note    Rhiannon Christianson is a 40 year old female.   No chief complaint on file.        HPI:     Patient is a very pleasant 40-year-old female presents to clinic for follow-up status post matrixectomy to bilateral borders of her left great toe.  She relates that she is doing well without pain or other concerns.  She has performed soaking and aftercare as advised.  No other complaints are mentioned.    Allergies: Patient has no known allergies.   Current Outpatient Medications   Medication Sig Dispense Refill    cephALEXin 500 MG Oral Cap Take 1 capsule (500 mg total) by mouth 2 (two) times daily. 14 capsule 0    hydrocortisone 2.5 % External Ointment Apply 1 Application topically 2 (two) times daily. 30 g 0      Past Medical History:    HPV in female    Moderate dysplasia of cervix      Past Surgical History:   Procedure Laterality Date    Breast lumpectomy Left 05/21/2016    Procedure: BREAST LUMPECTOMY;  Surgeon: Bj Boyce MD;  Location:  MAIN OR    [facility]  05/2009    Adrienne localization wire 1 site left (cpt=19281) Left 2016    EXC FOR AN ABSCESS      Family History   Adopted: Yes   Problem Relation Age of Onset    Heart Disease Mother     Diabetes Mother     Cancer Maternal Grandmother         not sure what type-deceaased    Stroke Maternal Grandfather     No Known Problems Paternal Grandmother     Other (alzheimeirs) Paternal Grandfather     Breast Cancer Other         pggm?      Social History     Socioeconomic History    Marital status:    Occupational History    Occupation: [employer]    Tobacco Use    Smoking status: Never    Smokeless tobacco: Never   Vaping Use    Vaping status: Never Used   Substance and Sexual Activity    Alcohol use: Yes     Alcohol/week: 0.0 - 1.0 standard drinks of alcohol    Drug use: No    Sexual activity: Yes     Partners: Male   Other Topics Concern    Caffeine Concern Yes     Comment: 2 cups of coffee daily     Exercise Yes      Comment: walking-daily    Seat Belt Yes    Self-Exams Yes     Comment: breast self exam           REVIEW OF SYSTEMS:     No n/v/f/c.      EXAM:   There were no vitals taken for this visit.  GENERAL: well developed, well nourished, in no apparent distress  EXTREMITIES:   1. Integument: Normal skin temperature and turgor.  Site of matrixectomy procedure to medial and lateral borders of left hallux are healing well without concerns.  2. Vascular: Dorsalis pedis two out of four bilateral and posterior tibial pulses two out of   four bilateral, capillary refill normal.   3. Musculoskeletal: All muscle groups are graded 5 out of 5 in the foot and ankle.   4. Neurological: Normal sharp dull sensation; reflexes normal.          ASSESSMENT AND PLAN:   Diagnoses and all orders for this visit:    Ingrown nail of great toe of left foot    Toe pain, left          Plan:    - Patient examined, chart history reviewed.  - Inspected sites of matrixectomy procedure.  Noted to be healing well without concerns at this time.  - Minimal bioburden curetted free without incident.  - Sites dressed with bacitracin and Band-Aid.  Continue soaking aftercare for a few more days until symptoms fully resolved.  - Educated patient acute signs of infection advised patient to seek immediate medical attention any concerns arise.  - Can follow-up as needed if pain, recurrence, or other concerns arise moving forward.    Nicholas Soria DPM    Dragon speech recognition software was used to prepare this note.  Errors in word recognition may occur.  Please contact me with any questions/concerns with this note.

## (undated) NOTE — LETTER
AUTHORIZATION FOR SURGICAL OPERATION OR OTHER PROCEDURE    1. I hereby authorize Dr. Nicholas Soria, and [facility] staff assigned to my case to perform the following operation and/or procedure at the [facility] site:    _______________________________________________________________________________________________    Partial Nail Avulsion Left Hallux  _______________________________________________________________________________________________    2.  My physician has explained the nature and purpose of the operation or other procedure, possible alternative methods of treatment, the risks involved, and the possibility of complication to me.  I acknowledge that no guarantee has been made as to the result that may be obtained.  3.  I recognize that, during the course of this operation, or other procedure, unforseen conditions may necessitate additional or different procedure than those listed above.  I, therefore, further authorize and request that the above named physician, his/her physician assistants or designees perform such procedures as are, in his/her professional opinion, necessary and desirable.  4.  Any tissue or organs removed in the operation or other procedure may be disposed of by and at the discretion of the [facility] and [facility].  5.  I understand that in the event of a medical emergency, I will be transported by local paramedics to [facility] or other hospital emergency department.  6.  I certify that I have read and fully understand the above consent to operation and/or other procedure.    7.  I acknowledge that my physician has explained sedation/analgesia administration to me including the risks and benefits.  I consent to the administration of sedation/analgesia as may be necessary or desirable in the judgement of my physician.    Witness signature: ___________________________________________________ Date:   ______/______/_____                    Time:  ________ A.M.  P.M.       Patient Name:  ______________________________________________________  (please print)      Patient signature:  ___________________________________________________             Relationship to Patient:           []  Parent    Responsible person                          []  Spouse  In case of minor or                    [] Other  _____________   Incompetent name:  __________________________________________________                               (please print)      _____________      Responsible person  In case of minor or  Incompetent signature:  _______________________________________________    Statement of Physician  My signature below affirms that prior to the time of the procedure, I have explained to the patient and/or his/her guardian, the risks and benefits involved in the proposed treatment and any reasonable alternative to the proposed treatment.  I have also explained the risks and benefits involved in the refusal of the proposed treatment and have answered the patient's questions.                        Date:  ______/______/_______  Provider                      Signature:  __________________________________________________________       Time:  ___________ A.M    P.M.

## (undated) NOTE — LETTER
AUTHORIZATION FOR SURGICAL OPERATION OR OTHER PROCEDURE    1. I hereby authorize Dr. Nicholas Soria, and [facility] staff assigned to my case to perform the following operation and/or procedure at the [facility] site:    _______________________________________________________________________________________________    PNA Right Hallux  _______________________________________________________________________________________________    2.  My physician has explained the nature and purpose of the operation or other procedure, possible alternative methods of treatment, the risks involved, and the possibility of complication to me.  I acknowledge that no guarantee has been made as to the result that may be obtained.  3.  I recognize that, during the course of this operation, or other procedure, unforseen conditions may necessitate additional or different procedure than those listed above.  I, therefore, further authorize and request that the above named physician, his/her physician assistants or designees perform such procedures as are, in his/her professional opinion, necessary and desirable.  4.  Any tissue or organs removed in the operation or other procedure may be disposed of by and at the discretion of the [facility] and [facility].  5.  I understand that in the event of a medical emergency, I will be transported by local paramedics to [facility] or other hospital emergency department.  6.  I certify that I have read and fully understand the above consent to operation and/or other procedure.    7.  I acknowledge that my physician has explained sedation/analgesia administration to me including the risks and benefits.  I consent to the administration of sedation/analgesia as may be necessary or desirable in the judgement of my physician.    Witness signature: ___________________________________________________ Date:  ______/______/_____                     Time:  ________ A.M.  P.M.       Patient Name:  ______________________________________________________  (please print)      Patient signature:  ___________________________________________________             Relationship to Patient:           []  Parent    Responsible person                          []  Spouse  In case of minor or                    [] Other  _____________   Incompetent name:  __________________________________________________                               (please print)      _____________      Responsible person  In case of minor or  Incompetent signature:  _______________________________________________    Statement of Physician  My signature below affirms that prior to the time of the procedure, I have explained to the patient and/or his/her guardian, the risks and benefits involved in the proposed treatment and any reasonable alternative to the proposed treatment.  I have also explained the risks and benefits involved in the refusal of the proposed treatment and have answered the patient's questions.                        Date:  ______/______/_______  Provider                      Signature:  __________________________________________________________       Time:  ___________ A.M    P.M.